# Patient Record
Sex: FEMALE | Race: WHITE | NOT HISPANIC OR LATINO | Employment: FULL TIME | URBAN - METROPOLITAN AREA
[De-identification: names, ages, dates, MRNs, and addresses within clinical notes are randomized per-mention and may not be internally consistent; named-entity substitution may affect disease eponyms.]

---

## 2017-04-18 DIAGNOSIS — Z45.02 ICD (IMPLANTABLE CARDIOVERTER-DEFIBRILLATOR) BATTERY DEPLETION: Primary | ICD-10-CM

## 2017-05-22 ENCOUNTER — HOSPITAL ENCOUNTER (OUTPATIENT)
Dept: PREADMISSION TESTING | Facility: OTHER | Age: 57
Discharge: HOME | End: 2017-05-22
Attending: INTERNAL MEDICINE
Payer: COMMERCIAL

## 2017-05-22 VITALS
OXYGEN SATURATION: 98 % | HEART RATE: 63 BPM | TEMPERATURE: 98 F | SYSTOLIC BLOOD PRESSURE: 136 MMHG | DIASTOLIC BLOOD PRESSURE: 85 MMHG | WEIGHT: 182 LBS | HEIGHT: 62 IN | BODY MASS INDEX: 33.49 KG/M2

## 2017-05-22 LAB
ANION GAP SERPL CALC-SCNC: 15 MMOL/L
APTT BLDCRRT: 22.9 SEC
BASOPHILS # BLD AUTO: 0.04 K/UL
BASOPHILS NFR BLD: 0.5 %
BUN SERPL-MCNC: 17 MG/DL
CALCIUM SERPL-MCNC: 10.2 MG/DL
CHLORIDE SERPL-SCNC: 102 MMOL/L
CO2 SERPL-SCNC: 23 MMOL/L
CREAT SERPL-MCNC: 0.9 MG/DL
DIFFERENTIAL METHOD: ABNORMAL
EOSINOPHIL # BLD AUTO: 0.5 K/UL
EOSINOPHIL NFR BLD: 6.2 %
ERYTHROCYTE [DISTWIDTH] IN BLOOD BY AUTOMATED COUNT: 14.8 %
EST. GFR  (AFRICAN AMERICAN): >60 ML/MIN/1.73 M^2
EST. GFR  (NON AFRICAN AMERICAN): >60 ML/MIN/1.73 M^2
GLUCOSE SERPL-MCNC: 188 MG/DL
HCT VFR BLD AUTO: 44.9 %
HGB BLD-MCNC: 14.4 G/DL
INR PPP: 0.9
LYMPHOCYTES # BLD AUTO: 1.1 K/UL
LYMPHOCYTES NFR BLD: 14.4 %
MCH RBC QN AUTO: 28.2 PG
MCHC RBC AUTO-ENTMCNC: 32.1 %
MCV RBC AUTO: 88 FL
MONOCYTES # BLD AUTO: 0.5 K/UL
MONOCYTES NFR BLD: 7.1 %
NEUTROPHILS # BLD AUTO: 5.4 K/UL
NEUTROPHILS NFR BLD: 71.5 %
PLATELET # BLD AUTO: 241 K/UL
PMV BLD AUTO: 10.1 FL
POTASSIUM SERPL-SCNC: 3.7 MMOL/L
PROTHROMBIN TIME: 10.1 SEC
RBC # BLD AUTO: 5.1 M/UL
SODIUM SERPL-SCNC: 140 MMOL/L
WBC # BLD AUTO: 7.57 K/UL

## 2017-05-22 PROCEDURE — 85610 PROTHROMBIN TIME: CPT

## 2017-05-22 PROCEDURE — 93010 ELECTROCARDIOGRAM REPORT: CPT | Mod: ,,, | Performed by: INTERNAL MEDICINE

## 2017-05-22 PROCEDURE — 80048 BASIC METABOLIC PNL TOTAL CA: CPT

## 2017-05-22 PROCEDURE — 85025 COMPLETE CBC W/AUTO DIFF WBC: CPT

## 2017-05-22 PROCEDURE — 36415 COLL VENOUS BLD VENIPUNCTURE: CPT

## 2017-05-22 PROCEDURE — 93005 ELECTROCARDIOGRAM TRACING: CPT

## 2017-05-22 PROCEDURE — 85730 THROMBOPLASTIN TIME PARTIAL: CPT

## 2017-05-22 RX ORDER — ROSUVASTATIN CALCIUM 20 MG/1
20 TABLET, COATED ORAL DAILY
COMMUNITY

## 2017-05-22 NOTE — DISCHARGE INSTRUCTIONS
PRE-ADMIT TESTING -  [phone]    [address]  [facility]        OUTPATIENT SURGERY UNIT - [phone]    Your surgery has been scheduled at [facility]. We are pleased to have the opportunity to serve you. For Further Information please call [phone].    On the day of surgery please report to the Information Desk on the 1st floor.    · CONTACT YOUR PHYSICIAN'S OFFICE THE DAY PRIOR TO YOUR SURGERY TO OBTAIN YOUR ARRIVAL TIME.     · The evening before surgery do not eat anything after 9 p.m. ( this includes hard candy, chewing gum and mints).  You may only have GATORADE, POWERADE AND WATER  from 9 p.m. until you leave your home.   DO NOT DRINK ANY LIQUIDS ON THE WAY TO THE HOSPITAL.      SPECIAL MEDICATION INSTRUCTIONS: TAKE medications checked off by the Anesthesiologist on your Medication List.    Angiogram Patients: Take medications as instructed by your physician, including aspirin.     Surgery Patients:    If you take ASPIRIN - Your PHYSICIAN/SURGEON will need to inform you IF/OR when you need to stop taking aspirin prior to your surgery.     Do Not take any medications containing IBUPROFEN.  Do Not Wear any make-up or dark nail polish   (especially eye make-up) to surgery. If you come to surgery with makeup on you will be required to remove the makeup or nail polish.    Do not shave your surgical area at least 5 days prior to your surgery. The surgical prep will be performed at the hospital according to Infection Control regulations.    Leave all valuables at home.   Do Not wear any jewelry or watches, including any metal in body piercings.  Contact Lens must be removed before surgery. Either do not wear the contact lens or bring a case and solution for storage.  Please bring a container for eyeglasses or dentures as required.  Bring any paperwork your physician has provided, such as consent forms,  history and physicals, doctor's orders, etc.   Bring comfortable clothes  that are loose fitting to wear upon discharge. Take into consideration the type of surgery being performed.  Maintain your diet as advised per your physician the day prior to surgery.      Adequate rest the night before surgery is advised.   Park in the Parking lot behind the hospital or in the [facility] across the street from the parking lot. Parking is complimentary.  If you will be discharged the same day as your procedure, please arrange for a responsible adult to drive you home or to accompany you if traveling by taxi.   YOU WILL NOT BE PERMITTED TO DRIVE OR TO LEAVE THE HOSPITAL ALONE AFTER SURGERY.   It is strongly recommended that you arrange for someone to remain with you for the first 24 hrs following your surgery.       Thank you for your cooperation.  The Staff of [facility].        Bathing Instructions                                                                 Please shower the evening before and morning of your procedure with    ANTIBACTERIAL SOAP. ( DIAL, etc )  Concentrate on the surgical area   for at least 3 minutes and rinse completely. Dry off as usual.   Do not use any deodorant, powder, body lotions, perfume, after shave or    cologne.

## 2017-05-24 ENCOUNTER — HOSPITAL ENCOUNTER (OUTPATIENT)
Facility: OTHER | Age: 57
Discharge: HOME | End: 2017-05-24
Attending: INTERNAL MEDICINE | Admitting: INTERNAL MEDICINE
Payer: COMMERCIAL

## 2017-05-24 VITALS
TEMPERATURE: 98 F | SYSTOLIC BLOOD PRESSURE: 97 MMHG | RESPIRATION RATE: 16 BRPM | BODY MASS INDEX: 33.49 KG/M2 | HEIGHT: 62 IN | DIASTOLIC BLOOD PRESSURE: 65 MMHG | HEART RATE: 53 BPM | OXYGEN SATURATION: 97 % | WEIGHT: 182 LBS

## 2017-05-24 DIAGNOSIS — Z45.02 ICD (IMPLANTABLE CARDIOVERTER-DEFIBRILLATOR) BATTERY DEPLETION: ICD-10-CM

## 2017-05-24 DIAGNOSIS — I25.10 ATHEROSCLEROTIC HEART DISEASE OF NATIVE CORONARY ARTERY WITHOUT ANGINA PECTORIS: ICD-10-CM

## 2017-05-24 DIAGNOSIS — Z95.810 AUTOMATIC IMPLANTABLE CARDIAC DEFIBRILLATOR IN SITU: Primary | ICD-10-CM

## 2017-05-24 DIAGNOSIS — I25.2 OLD MYOCARDIAL INFARCTION: ICD-10-CM

## 2017-05-24 LAB — POCT GLUCOSE: 155 MG/DL (ref 70–110)

## 2017-05-24 PROCEDURE — 63600175 PHARM REV CODE 636 W HCPCS

## 2017-05-24 PROCEDURE — 27201642 EP LAB PROCEDURE

## 2017-05-24 PROCEDURE — 25000003 PHARM REV CODE 250

## 2017-05-24 PROCEDURE — 63600175 PHARM REV CODE 636 W HCPCS: Performed by: INTERNAL MEDICINE

## 2017-05-24 RX ORDER — SODIUM CHLORIDE 9 MG/ML
INJECTION, SOLUTION INTRAVENOUS CONTINUOUS
Status: DISCONTINUED | OUTPATIENT
Start: 2017-05-24 | End: 2017-05-24 | Stop reason: HOSPADM

## 2017-05-24 RX ORDER — CEPHALEXIN 500 MG/1
500 CAPSULE ORAL EVERY 8 HOURS
Qty: 21 CAPSULE | Refills: 0 | Status: SHIPPED | OUTPATIENT
Start: 2017-05-24 | End: 2017-05-31

## 2017-05-24 RX ORDER — CEFAZOLIN SODIUM 2 G/50ML
2 SOLUTION INTRAVENOUS
Status: COMPLETED | OUTPATIENT
Start: 2017-05-24 | End: 2017-05-24

## 2017-05-24 RX ADMIN — CEFAZOLIN SODIUM 2 G: 2 SOLUTION INTRAVENOUS at 01:05

## 2017-05-24 RX ADMIN — SODIUM CHLORIDE: 9 INJECTION, SOLUTION INTRAVENOUS at 01:05

## 2017-05-24 NOTE — DISCHARGE SUMMARY
[facility]  Cardiology  Discharge Summary      Patient Name: Graciela Rhodes  MRN: [id number]  Admission Date: 5/24/2017  Hospital Length of Stay: 0 days  Discharge Date and Time:  05/24/2017 3:09 PM  Attending Physician: Garrett Watts MD  Discharging Provider: Payton Devries NP  Primary Care Physician: Jake Hess MD    HPI: Ms. Rhodes is a 56 year old female patient of Dr. Anthony with CAD, s/p STEMI, HFrEF 15% ischemic etiology, NYHA II, referred for Medtronic ICD gen change. Denies chest pain, PND, orthopnea. Stable FAM at 1 block. ICD for primary prevention, no ICD therapies received. No fever, chills, infection.    Generator replacment performed with preoperative antibiotics and under RN moderate sedation without complications.  Discharged home same day.     Procedure(s) (LRB):  REPLACEMENT-GENERATOR-ICD (N/A)     Indwelling Lines/Drains at time of discharge:  Lines/Drains/Airways          No matching active lines, drains, or airways          Hospital Course (synopsis of major diagnoses, care, treatment, and services provided during the course of the hospital stay): Ms. Rhodes is a 56 year old female patient of Dr. Anthony with CAD, s/p STEMI, HFrEF 15% ischemic etiology, NYHA II, referred for Medtronic ICD gen change. Denies chest pain, PND, orthopnea. Stable FAM at 1 block. ICD for primary prevention, no ICD therapies received. No fever, chills, infection.    Generator replacment performed with preoperative antibiotics and under RN moderate sedation without complications.  Discharged home same day.     Consults: none    Significant Diagnostic Studies: Labs:   BMP: No results for input(s): GLU, NA, K, CL, CO2, BUN, CREATININE, CALCIUM, MG in the last 48 hours., CBC No results for input(s): WBC, HGB, HCT, PLT in the last 48 hours. and INR   Lab Results   Component Value Date    INR 0.9 05/22/2017    INR 0.9 08/04/2009       Pending Diagnostic Studies:     None           Final Active Diagnoses:    Diagnosis Date Noted POA    ICD (implantable cardioverter-defibrillator) battery depletion [Z45.02] 05/24/2017 Not Applicable      Problems Resolved During this Admission:    Diagnosis Date Noted Date Resolved POA       Discharged Condition: fair    Follow Up:  RAI Degroot with [facility] Thursday June 1st    Patient Instructions:   Diet: cardiac    Activity: no driving x 24 hours; otherwise resume all normal activities    Incision: leave dressing in place; do not get incision wet.    Medications:  Reconciled Home Medications:   Current Discharge Medication List      START taking these medications    Details   cephALEXin (KEFLEX) 500 MG capsule Take 1 capsule (500 mg total) by mouth every 8 (eight) hours.  Qty: 21 capsule, Refills: 0         CONTINUE these medications which have NOT CHANGED    Details   aspirin (ECOTRIN) 81 MG EC tablet Take by mouth once daily.      furosemide (LASIX) 40 MG tablet Take 1 tablet (40 mg total) by mouth once daily.  Qty: 90 tablet, Refills: 3    Associated Diagnoses: Cardiomyopathy      levothyroxine (SYNTHROID) 75 MCG tablet       metoprolol tartrate (LOPRESSOR) 25 MG tablet Take 25 mg by mouth 2 (two) times daily.      ramipril (ALTACE) 2.5 MG capsule       rosuvastatin (CRESTOR) 20 MG tablet Take 20 mg by mouth once daily.      SITagliptan (JANUVIA) 100 MG Tab Take 100 mg by mouth once daily.      ACCU-CHEK MICHELLE PLUS Strp       ACCU-CHEK MULTICLIX LANCET lancets       clopidogrel (PLAVIX) 75 mg tablet Take 75 mg by mouth once daily.      ibuprofen (ADVIL,MOTRIN) 600 MG tablet Take 1 tablet (600 mg total) by mouth every 6 (six) hours as needed for Pain.  Qty: 20 tablet, Refills: 0      metformin (GLUCOPHAGE) 1000 MG tablet Take 1,000 mg by mouth 2 (two) times daily with meals.             Time spent on the discharge of patient: 30 minutes    Payton Devries NP  Cardiology  [facility]

## 2017-05-24 NOTE — DISCHARGE INSTRUCTIONS
Anesthesia: Monitored Anesthesia Care (MAC)    Anesthesia Safety  · Have an adult family member or friend drive you home after the procedure.  · For the first 24 hours after your surgery:  ¨ Do not drive or use heavy equipment.  ¨ Do not make important decisions or sign documents.  ¨ Avoid alcohol.  ¨ Have someone stay with you, if possible. They can watch for problems and help keep you safe.    Leave dressing in place; do not get incision wet

## 2018-01-29 ENCOUNTER — OFFICE VISIT (OUTPATIENT)
Dept: CARDIOLOGY | Facility: CLINIC | Age: 58
End: 2018-01-29
Attending: INTERNAL MEDICINE
Payer: COMMERCIAL

## 2018-01-29 VITALS
BODY MASS INDEX: 33.86 KG/M2 | SYSTOLIC BLOOD PRESSURE: 132 MMHG | WEIGHT: 184 LBS | HEART RATE: 77 BPM | DIASTOLIC BLOOD PRESSURE: 78 MMHG | HEIGHT: 62 IN

## 2018-01-29 DIAGNOSIS — I51.9 LV DYSFUNCTION: ICD-10-CM

## 2018-01-29 DIAGNOSIS — I25.10 ATHEROSCLEROSIS OF NATIVE CORONARY ARTERY OF NATIVE HEART WITHOUT ANGINA PECTORIS: Primary | ICD-10-CM

## 2018-01-29 DIAGNOSIS — R05.9 COUGH: Primary | ICD-10-CM

## 2018-01-29 DIAGNOSIS — I25.2 OLD MYOCARDIAL INFARCTION: ICD-10-CM

## 2018-01-29 DIAGNOSIS — Z95.810 AICD (AUTOMATIC CARDIOVERTER/DEFIBRILLATOR) PRESENT: ICD-10-CM

## 2018-01-29 DIAGNOSIS — E11.9 TYPE 2 DIABETES MELLITUS WITHOUT COMPLICATION, WITHOUT LONG-TERM CURRENT USE OF INSULIN: ICD-10-CM

## 2018-01-29 PROCEDURE — 3008F BODY MASS INDEX DOCD: CPT | Mod: S$GLB,,, | Performed by: INTERNAL MEDICINE

## 2018-01-29 PROCEDURE — 99213 OFFICE O/P EST LOW 20 MIN: CPT | Mod: S$GLB,,, | Performed by: INTERNAL MEDICINE

## 2018-01-29 RX ORDER — AZITHROMYCIN 250 MG/1
TABLET, FILM COATED ORAL
Qty: 6 TABLET | Refills: 0 | Status: SHIPPED | OUTPATIENT
Start: 2018-01-29 | End: 2018-02-03

## 2018-02-04 NOTE — PROGRESS NOTES
Subjective:    Patient ID:  Graciela Rhodes is a 57 y.o. female     HPI  Here for F/U of CAD, LV dysfunction, AICD, DM.    I feel well. I have no complaints.    Current Outpatient Prescriptions   Medication Sig    ACCU-CHEK MICHELLE PLUS Strp     ACCU-CHEK MULTICLIX LANCET lancets     aspirin (ECOTRIN) 81 MG EC tablet Take by mouth once daily.    clopidogrel (PLAVIX) 75 mg tablet Take 75 mg by mouth once daily.    furosemide (LASIX) 40 MG tablet TAKE 1 TABLET(40 MG) BY MOUTH EVERY DAY    ibuprofen (ADVIL,MOTRIN) 600 MG tablet Take 1 tablet (600 mg total) by mouth every 6 (six) hours as needed for Pain.    JANUVIA 100 mg Tab TAKE 1 TABLET BY MOUTH EVERY DAY    JANUVIA 100 mg Tab TAKE 1 TABLET BY MOUTH EVERY DAY    levothyroxine (SYNTHROID) 75 MCG tablet     metFORMIN (GLUCOPHAGE) 1000 MG tablet TAKE 1 TABLET BY MOUTH TWICE DAILY    metoprolol tartrate (LOPRESSOR) 25 MG tablet Take 25 mg by mouth 2 (two) times daily.    ramipril (ALTACE) 2.5 MG capsule     rosuvastatin (CRESTOR) 20 MG tablet Take 20 mg by mouth once daily.     No current facility-administered medications for this visit.          Review of Systems   Constitution: Negative for chills, decreased appetite, fever, weight gain and weight loss.   HENT: Negative for congestion, hearing loss and sore throat.    Eyes: Negative for blurred vision, double vision and visual disturbance.   Cardiovascular: Negative for chest pain, claudication, dyspnea on exertion, leg swelling, palpitations and syncope.   Respiratory: Negative for cough, hemoptysis, shortness of breath, sputum production and wheezing.    Endocrine: Negative for cold intolerance and heat intolerance.   Hematologic/Lymphatic: Negative for bleeding problem. Does not bruise/bleed easily.   Skin: Negative for color change, dry skin, flushing and itching.   Musculoskeletal: Negative for back pain, joint pain and myalgias.   Gastrointestinal: Negative for abdominal pain, anorexia,  "constipation, diarrhea, dysphagia, nausea and vomiting.        No bleeding per rectum   Genitourinary: Negative for dysuria, flank pain, frequency, hematuria and nocturia.   Neurological: Negative for dizziness, headaches, light-headedness, loss of balance, seizures and tremors.   Psychiatric/Behavioral: Negative for altered mental status and depression.         Vitals:    01/29/18 1110   BP: 132/78   Pulse: 77   Weight: 83.5 kg (184 lb)   Height: 5' 2" (1.575 m)     Objective:    Physical Exam   Constitutional: She is oriented to person, place, and time. She appears well-developed and well-nourished.   HENT:   Head: Normocephalic and atraumatic.   Nose: Nose normal.   Mouth/Throat: Oropharynx is clear and moist.   Eyes: Conjunctivae and EOM are normal. Pupils are equal, round, and reactive to light.   Neck: Neck supple. No tracheal deviation present. No thyromegaly present.   Cardiovascular: Normal rate, regular rhythm and intact distal pulses.  Exam reveals no gallop and no friction rub.    No murmur heard.  Pulmonary/Chest: No respiratory distress. She has no wheezes. She has no rales. She exhibits no tenderness.   Abdominal: Soft. Bowel sounds are normal. She exhibits no distension and no mass. There is no tenderness. There is no rebound and no guarding.   Musculoskeletal: Normal range of motion.   Lymphadenopathy:     She has no cervical adenopathy.   Neurological: She is alert and oriented to person, place, and time.   Skin: Skin is warm and dry.   Psychiatric: Her behavior is normal.       Echo from 9/2017 reviewed.    Assessment:       1. Atherosclerosis of native coronary artery of native heart without angina pectoris    2. Old myocardial infarction    3. LV dysfunction    4. AICD (automatic cardioverter/defibrillator) present    5. Type 2 diabetes mellitus without complication, without long-term current use of insulin         Plan:       Stable  Continue the same            "

## 2018-06-04 ENCOUNTER — OFFICE VISIT (OUTPATIENT)
Dept: CARDIOLOGY | Facility: CLINIC | Age: 58
End: 2018-06-04
Attending: INTERNAL MEDICINE
Payer: COMMERCIAL

## 2018-06-04 VITALS
SYSTOLIC BLOOD PRESSURE: 108 MMHG | WEIGHT: 174 LBS | DIASTOLIC BLOOD PRESSURE: 76 MMHG | HEART RATE: 71 BPM | BODY MASS INDEX: 32.02 KG/M2 | HEIGHT: 62 IN

## 2018-06-04 DIAGNOSIS — I51.9 LV DYSFUNCTION: ICD-10-CM

## 2018-06-04 DIAGNOSIS — I25.10 ATHEROSCLEROSIS OF NATIVE CORONARY ARTERY OF NATIVE HEART WITHOUT ANGINA PECTORIS: ICD-10-CM

## 2018-06-04 DIAGNOSIS — Z95.810 ICD (IMPLANTABLE CARDIOVERTER-DEFIBRILLATOR) IN PLACE: Primary | ICD-10-CM

## 2018-06-04 DIAGNOSIS — I25.2 OLD MYOCARDIAL INFARCTION: ICD-10-CM

## 2018-06-04 DIAGNOSIS — Z95.810 AICD (AUTOMATIC CARDIOVERTER/DEFIBRILLATOR) PRESENT: ICD-10-CM

## 2018-06-04 DIAGNOSIS — E11.9 TYPE 2 DIABETES MELLITUS WITHOUT COMPLICATION, WITHOUT LONG-TERM CURRENT USE OF INSULIN: ICD-10-CM

## 2018-06-04 PROCEDURE — 93283 PRGRMG EVAL IMPLANTABLE DFB: CPT | Mod: S$GLB,,, | Performed by: INTERNAL MEDICINE

## 2018-06-04 PROCEDURE — 3008F BODY MASS INDEX DOCD: CPT | Mod: CPTII,S$GLB,, | Performed by: INTERNAL MEDICINE

## 2018-06-04 PROCEDURE — 99214 OFFICE O/P EST MOD 30 MIN: CPT | Mod: S$GLB,,, | Performed by: INTERNAL MEDICINE

## 2018-06-12 NOTE — PROGRESS NOTES
Subjective:    Patient ID:  Graciela Rhodes is a 58 y.o. female     HPI  here for follow-up of coronary artery disease, previous myocardial infarction, left ventricular dysfunction, last echocardiogram in September of 2017 with an ejection fraction of 35-40%, AICD, diabetes mellitus.    I am doing well.  I get around without any limitations.  I have no complaints.    Current Outpatient Prescriptions   Medication Sig    JANUVIA 100 mg Tab TAKE 1 TABLET BY MOUTH EVERY DAY    ACCU-CHEK MICHELLE PLUS Strp     ACCU-CHEK MULTICLIX LANCET lancets     aspirin (ECOTRIN) 81 MG EC tablet Take by mouth once daily.    clopidogrel (PLAVIX) 75 mg tablet Take 75 mg by mouth once daily.    furosemide (LASIX) 40 MG tablet TAKE 1 TABLET(40 MG) BY MOUTH EVERY DAY    furosemide (LASIX) 40 MG tablet TAKE 1 TABLET(40 MG) BY MOUTH EVERY DAY    ibuprofen (ADVIL,MOTRIN) 600 MG tablet Take 1 tablet (600 mg total) by mouth every 6 (six) hours as needed for Pain.    levothyroxine (SYNTHROID) 75 MCG tablet     metFORMIN (GLUCOPHAGE) 1000 MG tablet TAKE 1 TABLET BY MOUTH TWICE DAILY    metFORMIN (GLUCOPHAGE) 1000 MG tablet TAKE 1 TABLET BY MOUTH TWICE DAILY    metoprolol tartrate (LOPRESSOR) 25 MG tablet Take 25 mg by mouth 2 (two) times daily.    ramipril (ALTACE) 2.5 MG capsule     rosuvastatin (CRESTOR) 20 MG tablet Take 20 mg by mouth once daily.     No current facility-administered medications for this visit.          Review of Systems   Constitution: Negative for chills, decreased appetite, fever, weight gain and weight loss.   HENT: Negative for congestion, hearing loss and sore throat.    Eyes: Negative for blurred vision, double vision and visual disturbance.   Cardiovascular: Negative for chest pain, claudication, dyspnea on exertion, leg swelling, palpitations and syncope.   Respiratory: Negative for cough, hemoptysis, shortness of breath, sputum production and wheezing.    Endocrine: Negative for cold intolerance and  "heat intolerance.   Hematologic/Lymphatic: Negative for bleeding problem. Does not bruise/bleed easily.   Skin: Negative for color change, dry skin, flushing and itching.   Musculoskeletal: Negative for back pain, joint pain and myalgias.   Gastrointestinal: Negative for abdominal pain, anorexia, constipation, diarrhea, dysphagia, nausea and vomiting.        No bleeding per rectum   Genitourinary: Negative for dysuria, flank pain, frequency, hematuria and nocturia.   Neurological: Negative for dizziness, headaches, light-headedness, loss of balance, seizures and tremors.   Psychiatric/Behavioral: Negative for altered mental status and depression.         Vitals:    06/04/18 1100   BP: 108/76   Pulse: 71   Weight: 78.9 kg (174 lb)   Height: 5' 2" (1.575 m)     Objective:    Physical Exam   Constitutional: She is oriented to person, place, and time. She appears well-developed and well-nourished.   HENT:   Head: Normocephalic and atraumatic.   Nose: Nose normal.   Mouth/Throat: Oropharynx is clear and moist.   Eyes: Conjunctivae and EOM are normal. Pupils are equal, round, and reactive to light.   Neck: Neck supple. No tracheal deviation present. No thyromegaly present.   Cardiovascular: Normal rate, regular rhythm and intact distal pulses.  Exam reveals no gallop and no friction rub.    No murmur heard.  Pulmonary/Chest: No respiratory distress. She has no wheezes. She has no rales. She exhibits no tenderness.   Abdominal: Soft. Bowel sounds are normal. She exhibits no distension and no mass. There is no tenderness. There is no rebound and no guarding.   Musculoskeletal: Normal range of motion.   Lymphadenopathy:     She has no cervical adenopathy.   Neurological: She is alert and oriented to person, place, and time.   Skin: Skin is warm and dry.   Psychiatric: Her behavior is normal.       AICD interrogated.    Assessment:           2. Atherosclerosis of native coronary artery of native heart without angina pectoris  "   3. Old myocardial infarction    4. AICD (automatic cardioverter/defibrillator) present    5. LV dysfunction    6. Type 2 diabetes mellitus without complication, without long-term current use of insulin         Plan:       Stable.  No changes, continue the same medications.

## 2018-10-01 ENCOUNTER — OFFICE VISIT (OUTPATIENT)
Dept: CARDIOLOGY | Facility: CLINIC | Age: 58
End: 2018-10-01
Attending: INTERNAL MEDICINE
Payer: COMMERCIAL

## 2018-10-01 VITALS
BODY MASS INDEX: 30.36 KG/M2 | SYSTOLIC BLOOD PRESSURE: 124 MMHG | HEART RATE: 77 BPM | WEIGHT: 165 LBS | HEIGHT: 62 IN | DIASTOLIC BLOOD PRESSURE: 74 MMHG

## 2018-10-01 DIAGNOSIS — Z95.810 AICD (AUTOMATIC CARDIOVERTER/DEFIBRILLATOR) PRESENT: ICD-10-CM

## 2018-10-01 DIAGNOSIS — I25.2 OLD MYOCARDIAL INFARCTION: ICD-10-CM

## 2018-10-01 DIAGNOSIS — I51.9 LV DYSFUNCTION: ICD-10-CM

## 2018-10-01 DIAGNOSIS — E11.9 TYPE 2 DIABETES MELLITUS WITHOUT COMPLICATION, WITHOUT LONG-TERM CURRENT USE OF INSULIN: ICD-10-CM

## 2018-10-01 DIAGNOSIS — I25.10 ATHEROSCLEROSIS OF NATIVE CORONARY ARTERY OF NATIVE HEART WITHOUT ANGINA PECTORIS: Primary | ICD-10-CM

## 2018-10-01 PROCEDURE — 99213 OFFICE O/P EST LOW 20 MIN: CPT | Mod: S$GLB,,, | Performed by: INTERNAL MEDICINE

## 2018-10-01 PROCEDURE — 3008F BODY MASS INDEX DOCD: CPT | Mod: CPTII,S$GLB,, | Performed by: INTERNAL MEDICINE

## 2018-10-01 NOTE — PROGRESS NOTES
Subjective:    Patient ID:  Graciela Rhodes is a 58 y.o. female     HPI   Here for follow-up of coronary artery disease, previous myocardial infarction, left ventricular dysfunction, AICD, diabetes mellitus.    I feel well.  I have no complaints.    Current Outpatient Medications   Medication Sig    ACCU-CHEK MICHELLE PLUS Strp     ACCU-CHEK MULTICLIX LANCET lancets     aspirin (ECOTRIN) 81 MG EC tablet Take by mouth once daily.    clopidogrel (PLAVIX) 75 mg tablet Take 75 mg by mouth once daily.    furosemide (LASIX) 40 MG tablet TAKE 1 TABLET(40 MG) BY MOUTH EVERY DAY    furosemide (LASIX) 40 MG tablet TAKE 1 TABLET(40 MG) BY MOUTH EVERY DAY    ibuprofen (ADVIL,MOTRIN) 600 MG tablet Take 1 tablet (600 mg total) by mouth every 6 (six) hours as needed for Pain.    JANUVIA 100 mg Tab TAKE 1 TABLET BY MOUTH EVERY DAY    levothyroxine (SYNTHROID) 75 MCG tablet     metFORMIN (GLUCOPHAGE) 1000 MG tablet TAKE 1 TABLET BY MOUTH TWICE DAILY    metFORMIN (GLUCOPHAGE) 1000 MG tablet TAKE 1 TABLET BY MOUTH TWICE DAILY    metoprolol tartrate (LOPRESSOR) 25 MG tablet Take 25 mg by mouth 2 (two) times daily.    ramipril (ALTACE) 2.5 MG capsule     rosuvastatin (CRESTOR) 20 MG tablet Take 20 mg by mouth once daily.     No current facility-administered medications for this visit.          Review of Systems   Constitution: Negative for chills, decreased appetite, fever, weight gain and weight loss.   HENT: Negative for congestion, hearing loss and sore throat.    Eyes: Negative for blurred vision, double vision and visual disturbance.   Cardiovascular: Negative for chest pain, claudication, dyspnea on exertion, leg swelling, palpitations and syncope.   Respiratory: Negative for cough, hemoptysis, shortness of breath, sputum production and wheezing.    Endocrine: Negative for cold intolerance and heat intolerance.   Hematologic/Lymphatic: Negative for bleeding problem. Does not bruise/bleed easily.   Skin: Negative for  "color change, dry skin, flushing and itching.   Musculoskeletal: Negative for back pain, joint pain and myalgias.   Gastrointestinal: Negative for abdominal pain, anorexia, constipation, diarrhea, dysphagia, nausea and vomiting.        No bleeding per rectum   Genitourinary: Negative for dysuria, flank pain, frequency, hematuria and nocturia.   Neurological: Negative for dizziness, headaches, light-headedness, loss of balance, seizures and tremors.   Psychiatric/Behavioral: Negative for altered mental status and depression.         Vitals:    10/01/18 1012   BP: 124/74   Pulse: 77   Weight: 74.8 kg (165 lb)   Height: 5' 2" (1.575 m)     Objective:    Physical Exam   Constitutional: She is oriented to person, place, and time. She appears well-developed and well-nourished.   HENT:   Head: Normocephalic and atraumatic.   Nose: Nose normal.   Mouth/Throat: Oropharynx is clear and moist.   Eyes: Conjunctivae and EOM are normal. Pupils are equal, round, and reactive to light.   Neck: Neck supple. No tracheal deviation present. No thyromegaly present.   Cardiovascular: Normal rate, regular rhythm and intact distal pulses. Exam reveals no gallop and no friction rub.   No murmur heard.  Pulmonary/Chest: No respiratory distress. She has no wheezes. She has no rales. She exhibits no tenderness.   Abdominal: Soft. Bowel sounds are normal. She exhibits no distension and no mass. There is no tenderness. There is no rebound and no guarding.   Musculoskeletal: Normal range of motion.   Lymphadenopathy:     She has no cervical adenopathy.   Neurological: She is alert and oriented to person, place, and time.   Skin: Skin is warm and dry.   Psychiatric: Her behavior is normal.         Assessment:       1. Atherosclerosis of native coronary artery of native heart without angina pectoris    2. Old myocardial infarction    3. LV dysfunction    4. AICD (automatic cardioverter/defibrillator) present    5. Type 2 diabetes mellitus without " complication, without long-term current use of insulin         Plan:       Stable.  Continue the same medical regimen.

## 2019-01-26 DIAGNOSIS — E11.9 DIABETES MELLITUS WITHOUT COMPLICATION: ICD-10-CM

## 2019-01-28 RX ORDER — SITAGLIPTIN 100 MG/1
TABLET, FILM COATED ORAL
Qty: 30 TABLET | Refills: 0 | Status: SHIPPED | OUTPATIENT
Start: 2019-01-28 | End: 2019-02-24 | Stop reason: SDUPTHER

## 2019-02-04 ENCOUNTER — OFFICE VISIT (OUTPATIENT)
Dept: CARDIOLOGY | Facility: CLINIC | Age: 59
End: 2019-02-04
Attending: INTERNAL MEDICINE
Payer: COMMERCIAL

## 2019-02-04 VITALS
SYSTOLIC BLOOD PRESSURE: 125 MMHG | WEIGHT: 170 LBS | HEART RATE: 86 BPM | DIASTOLIC BLOOD PRESSURE: 76 MMHG | BODY MASS INDEX: 31.28 KG/M2 | HEIGHT: 62 IN

## 2019-02-04 DIAGNOSIS — E11.9 TYPE 2 DIABETES MELLITUS WITHOUT COMPLICATION, WITHOUT LONG-TERM CURRENT USE OF INSULIN: ICD-10-CM

## 2019-02-04 DIAGNOSIS — I25.10 ATHEROSCLEROSIS OF NATIVE CORONARY ARTERY OF NATIVE HEART WITHOUT ANGINA PECTORIS: Primary | ICD-10-CM

## 2019-02-04 DIAGNOSIS — Z95.810 AICD (AUTOMATIC CARDIOVERTER/DEFIBRILLATOR) PRESENT: ICD-10-CM

## 2019-02-04 DIAGNOSIS — I25.2 OLD MYOCARDIAL INFARCTION: ICD-10-CM

## 2019-02-04 DIAGNOSIS — I51.9 LV DYSFUNCTION: ICD-10-CM

## 2019-02-04 PROCEDURE — 93000 PR ELECTROCARDIOGRAM, COMPLETE: ICD-10-PCS | Mod: S$GLB,,, | Performed by: INTERNAL MEDICINE

## 2019-02-04 PROCEDURE — 99213 OFFICE O/P EST LOW 20 MIN: CPT | Mod: S$GLB,,, | Performed by: INTERNAL MEDICINE

## 2019-02-04 PROCEDURE — 99213 PR OFFICE/OUTPT VISIT, EST, LEVL III, 20-29 MIN: ICD-10-PCS | Mod: S$GLB,,, | Performed by: INTERNAL MEDICINE

## 2019-02-04 PROCEDURE — 93000 ELECTROCARDIOGRAM COMPLETE: CPT | Mod: S$GLB,,, | Performed by: INTERNAL MEDICINE

## 2019-02-04 NOTE — PROGRESS NOTES
Subjective:    Patient ID:  Graciela Rhodes is a 58 y.o. female     HPI   Here for follow-up of coronary artery disease, previous myocardial infarction, left ventricular dysfunction, status post placement of an AICD, diabetes    [place].  I feel well.  I am physically quite active.  Have no complaints.    Current Outpatient Medications   Medication Sig    metFORMIN (GLUCOPHAGE) 1000 MG tablet TAKE 1 TABLET BY MOUTH TWICE DAILY    ACCU-CHEK MICHELLE PLUS Strp     ACCU-CHEK MULTICLIX LANCET lancets     aspirin (ECOTRIN) 81 MG EC tablet Take by mouth once daily.    clopidogrel (PLAVIX) 75 mg tablet Take 75 mg by mouth once daily.    furosemide (LASIX) 40 MG tablet TAKE 1 TABLET(40 MG) BY MOUTH EVERY DAY    furosemide (LASIX) 40 MG tablet TAKE 1 TABLET(40 MG) BY MOUTH EVERY DAY    ibuprofen (ADVIL,MOTRIN) 600 MG tablet Take 1 tablet (600 mg total) by mouth every 6 (six) hours as needed for Pain.    JANUVIA 100 mg Tab TAKE 1 TABLET BY MOUTH EVERY DAY    levothyroxine (SYNTHROID) 75 MCG tablet     metFORMIN (GLUCOPHAGE) 1000 MG tablet TAKE 1 TABLET BY MOUTH TWICE DAILY    metoprolol tartrate (LOPRESSOR) 25 MG tablet Take 25 mg by mouth 2 (two) times daily.    ramipril (ALTACE) 2.5 MG capsule     rosuvastatin (CRESTOR) 20 MG tablet Take 20 mg by mouth once daily.     No current facility-administered medications for this visit.          Review of Systems   Constitution: Negative for chills, decreased appetite, fever, weight gain and weight loss.   HENT: Negative for congestion, hearing loss and sore throat.    Eyes: Negative for blurred vision, double vision and visual disturbance.   Cardiovascular: Negative for chest pain, claudication, dyspnea on exertion, leg swelling, palpitations and syncope.   Respiratory: Negative for cough, hemoptysis, shortness of breath, sputum production and wheezing.    Endocrine: Negative for cold intolerance and heat intolerance.   Hematologic/Lymphatic: Negative for bleeding  "problem. Does not bruise/bleed easily.   Skin: Negative for color change, dry skin, flushing and itching.   Musculoskeletal: Negative for back pain, joint pain and myalgias.   Gastrointestinal: Negative for abdominal pain, anorexia, constipation, diarrhea, dysphagia, nausea and vomiting.        No bleeding per rectum   Genitourinary: Negative for dysuria, flank pain, frequency, hematuria and nocturia.   Neurological: Negative for dizziness, headaches, light-headedness, loss of balance, seizures and tremors.   Psychiatric/Behavioral: Negative for altered mental status and depression.         Vitals:    02/04/19 1044   BP: 125/76   Pulse: 86   Weight: 77.1 kg (170 lb)   Height: 5' 2" (1.575 m)     Objective:    Physical Exam   Constitutional: She is oriented to person, place, and time. She appears well-developed and well-nourished.   HENT:   Head: Normocephalic and atraumatic.   Nose: Nose normal.   Mouth/Throat: Oropharynx is clear and moist.   Eyes: Conjunctivae and EOM are normal. Pupils are equal, round, and reactive to light.   Neck: Neck supple. No tracheal deviation present. No thyromegaly present.   Cardiovascular: Normal rate, regular rhythm and intact distal pulses. Exam reveals no gallop and no friction rub.   No murmur heard.  Pulmonary/Chest: No respiratory distress. She has no wheezes. She has no rales. She exhibits no tenderness.   Abdominal: Soft. Bowel sounds are normal. She exhibits no distension and no mass. There is no tenderness. There is no rebound and no guarding.   Musculoskeletal: Normal range of motion.   Lymphadenopathy:     She has no cervical adenopathy.   Neurological: She is alert and oriented to person, place, and time.   Skin: Skin is warm and dry.   Psychiatric: Her behavior is normal.         Assessment:       1. Atherosclerosis of native coronary artery of native heart without angina pectoris    2. Old myocardial infarction    3. LV dysfunction    4. AICD (automatic " cardioverter/defibrillator) present    5. Type 2 diabetes mellitus without complication, without long-term current use of insulin         Plan:         Stable.  Continue the present pharmacological regimen.

## 2019-02-24 DIAGNOSIS — E11.9 DIABETES MELLITUS WITHOUT COMPLICATION: ICD-10-CM

## 2019-02-25 RX ORDER — SITAGLIPTIN 100 MG/1
TABLET, FILM COATED ORAL
Qty: 30 TABLET | Refills: 0 | Status: SHIPPED | OUTPATIENT
Start: 2019-02-25 | End: 2019-03-24 | Stop reason: SDUPTHER

## 2019-03-24 DIAGNOSIS — E11.9 DIABETES MELLITUS WITHOUT COMPLICATION: ICD-10-CM

## 2019-03-25 RX ORDER — SITAGLIPTIN 100 MG/1
TABLET, FILM COATED ORAL
Qty: 30 TABLET | Refills: 0 | Status: SHIPPED | OUTPATIENT
Start: 2019-03-25 | End: 2019-04-21 | Stop reason: SDUPTHER

## 2019-04-21 DIAGNOSIS — E11.9 DIABETES MELLITUS WITHOUT COMPLICATION: ICD-10-CM

## 2019-04-22 RX ORDER — SITAGLIPTIN 100 MG/1
TABLET, FILM COATED ORAL
Qty: 30 TABLET | Refills: 0 | Status: SHIPPED | OUTPATIENT
Start: 2019-04-22 | End: 2019-05-19 | Stop reason: SDUPTHER

## 2019-05-19 DIAGNOSIS — E11.9 DIABETES MELLITUS WITHOUT COMPLICATION: ICD-10-CM

## 2019-05-20 RX ORDER — SITAGLIPTIN 100 MG/1
TABLET, FILM COATED ORAL
Qty: 30 TABLET | Refills: 0 | Status: SHIPPED | OUTPATIENT
Start: 2019-05-20 | End: 2019-06-16 | Stop reason: SDUPTHER

## 2019-06-03 ENCOUNTER — OFFICE VISIT (OUTPATIENT)
Dept: CARDIOLOGY | Facility: CLINIC | Age: 59
End: 2019-06-03
Attending: INTERNAL MEDICINE
Payer: COMMERCIAL

## 2019-06-03 VITALS
HEIGHT: 62 IN | WEIGHT: 175 LBS | BODY MASS INDEX: 32.2 KG/M2 | HEART RATE: 68 BPM | SYSTOLIC BLOOD PRESSURE: 128 MMHG | DIASTOLIC BLOOD PRESSURE: 77 MMHG

## 2019-06-03 DIAGNOSIS — I51.9 LV DYSFUNCTION: ICD-10-CM

## 2019-06-03 DIAGNOSIS — E11.9 TYPE 2 DIABETES MELLITUS WITHOUT COMPLICATION, WITHOUT LONG-TERM CURRENT USE OF INSULIN: ICD-10-CM

## 2019-06-03 DIAGNOSIS — I25.2 OLD MYOCARDIAL INFARCTION: ICD-10-CM

## 2019-06-03 DIAGNOSIS — Z95.810 AICD (AUTOMATIC CARDIOVERTER/DEFIBRILLATOR) PRESENT: ICD-10-CM

## 2019-06-03 DIAGNOSIS — I25.10 ATHEROSCLEROSIS OF NATIVE CORONARY ARTERY OF NATIVE HEART WITHOUT ANGINA PECTORIS: Primary | ICD-10-CM

## 2019-06-03 PROCEDURE — 93283 PRGRMG EVAL IMPLANTABLE DFB: CPT | Mod: S$GLB,,, | Performed by: INTERNAL MEDICINE

## 2019-06-03 PROCEDURE — 99213 OFFICE O/P EST LOW 20 MIN: CPT | Mod: S$GLB,,, | Performed by: INTERNAL MEDICINE

## 2019-06-03 PROCEDURE — 93283 PR PROGRAM EVAL (IN PERSON) IMPLANT DEVICE,CARDVERT/DEFIB,2 LEAD: ICD-10-PCS | Mod: S$GLB,,, | Performed by: INTERNAL MEDICINE

## 2019-06-03 PROCEDURE — 99213 PR OFFICE/OUTPT VISIT, EST, LEVL III, 20-29 MIN: ICD-10-PCS | Mod: S$GLB,,, | Performed by: INTERNAL MEDICINE

## 2019-06-04 NOTE — PROGRESS NOTES
Subjective:    Patient ID:  Graciela Rhodes is a 59 y.o. female     HPI   Here for F/U of CAD, previous anterior MI, LV dysfunction, AICD placement, diabetes mellitus, hypothyroidism.    I feel well. I am active. No complaints.    Current Outpatient Medications   Medication Sig    ACCU-CHEK MICHELLE PLUS Strp     ACCU-CHEK MULTICLIX LANCET lancets     aspirin (ECOTRIN) 81 MG EC tablet Take by mouth once daily.    clopidogrel (PLAVIX) 75 mg tablet Take 75 mg by mouth once daily.    furosemide (LASIX) 40 MG tablet TAKE 1 TABLET(40 MG) BY MOUTH EVERY DAY    furosemide (LASIX) 40 MG tablet TAKE 1 TABLET(40 MG) BY MOUTH EVERY DAY    ibuprofen (ADVIL,MOTRIN) 600 MG tablet Take 1 tablet (600 mg total) by mouth every 6 (six) hours as needed for Pain.    JANUVIA 100 mg Tab TAKE 1 TABLET BY MOUTH EVERY DAY    levothyroxine (SYNTHROID) 75 MCG tablet     metFORMIN (GLUCOPHAGE) 1000 MG tablet TAKE 1 TABLET BY MOUTH TWICE DAILY    metFORMIN (GLUCOPHAGE) 1000 MG tablet TAKE 1 TABLET BY MOUTH TWICE DAILY    metoprolol tartrate (LOPRESSOR) 25 MG tablet Take 25 mg by mouth 2 (two) times daily.    ramipril (ALTACE) 2.5 MG capsule     rosuvastatin (CRESTOR) 20 MG tablet Take 20 mg by mouth once daily.     No current facility-administered medications for this visit.          Review of Systems   Constitution: Negative for chills, decreased appetite, fever, weight gain and weight loss.   HENT: Negative for congestion, hearing loss and sore throat.    Eyes: Negative for blurred vision, double vision and visual disturbance.   Cardiovascular: Negative for chest pain, claudication, dyspnea on exertion, leg swelling, palpitations and syncope.   Respiratory: Negative for cough, hemoptysis, shortness of breath, sputum production and wheezing.    Endocrine: Negative for cold intolerance and heat intolerance.   Hematologic/Lymphatic: Negative for bleeding problem. Does not bruise/bleed easily.   Skin: Negative for color change, dry  "skin, flushing and itching.   Musculoskeletal: Negative for back pain, joint pain and myalgias.   Gastrointestinal: Negative for abdominal pain, anorexia, constipation, diarrhea, dysphagia, nausea and vomiting.        No bleeding per rectum   Genitourinary: Negative for dysuria, flank pain, frequency, hematuria and nocturia.   Neurological: Negative for dizziness, headaches, light-headedness, loss of balance, seizures and tremors.   Psychiatric/Behavioral: Negative for altered mental status and depression.         Vitals:    06/03/19 1035   BP: 128/77   Pulse: 68   Weight: 79.4 kg (175 lb)   Height: 5' 2" (1.575 m)     Objective:    Physical Exam   Constitutional: She is oriented to person, place, and time. She appears well-developed and well-nourished.   HENT:   Head: Normocephalic and atraumatic.   Nose: Nose normal.   Mouth/Throat: Oropharynx is clear and moist.   Eyes: Pupils are equal, round, and reactive to light. Conjunctivae and EOM are normal.   Neck: Neck supple. No tracheal deviation present. No thyromegaly present.   Cardiovascular: Normal rate, regular rhythm and intact distal pulses. Exam reveals no gallop and no friction rub.   No murmur heard.  Pulmonary/Chest: No respiratory distress. She has no wheezes. She has no rales. She exhibits no tenderness.   Abdominal: Soft. Bowel sounds are normal. She exhibits no distension and no mass. There is no tenderness. There is no rebound and no guarding.   Musculoskeletal: Normal range of motion.   Lymphadenopathy:     She has no cervical adenopathy.   Neurological: She is alert and oriented to person, place, and time.   Skin: Skin is warm and dry.   Psychiatric: Her behavior is normal.         Assessment:       1. Atherosclerosis of native coronary artery of native heart without angina pectoris    2. Old myocardial infarction    3. AICD (automatic cardioverter/defibrillator) present    4. Type 2 diabetes mellitus without complication, without long-term current " use of insulin    5. LV dysfunction    6.      Hypothyroidism.     Plan:       Stable  Continue the same medications.

## 2019-06-16 DIAGNOSIS — E11.9 DIABETES MELLITUS WITHOUT COMPLICATION: ICD-10-CM

## 2019-06-17 RX ORDER — SITAGLIPTIN 100 MG/1
TABLET, FILM COATED ORAL
Qty: 30 TABLET | Refills: 0 | Status: SHIPPED | OUTPATIENT
Start: 2019-06-17 | End: 2019-07-15 | Stop reason: SDUPTHER

## 2019-07-15 DIAGNOSIS — E11.9 DIABETES MELLITUS WITHOUT COMPLICATION: ICD-10-CM

## 2019-07-15 RX ORDER — SITAGLIPTIN 100 MG/1
TABLET, FILM COATED ORAL
Qty: 30 TABLET | Refills: 0 | Status: SHIPPED | OUTPATIENT
Start: 2019-07-15

## 2019-07-25 DIAGNOSIS — E11.9 DIABETES MELLITUS WITHOUT COMPLICATION: ICD-10-CM

## 2019-07-25 RX ORDER — SITAGLIPTIN 100 MG/1
TABLET, FILM COATED ORAL
Qty: 30 TABLET | Refills: 0 | Status: ON HOLD | OUTPATIENT
Start: 2019-07-25 | End: 2023-10-01 | Stop reason: HOSPADM

## 2019-10-07 ENCOUNTER — CLINICAL SUPPORT (OUTPATIENT)
Dept: CARDIOLOGY | Facility: CLINIC | Age: 59
End: 2019-10-07
Attending: INTERNAL MEDICINE
Payer: COMMERCIAL

## 2019-10-07 VITALS
BODY MASS INDEX: 32.76 KG/M2 | WEIGHT: 178 LBS | HEART RATE: 63 BPM | HEIGHT: 62 IN | DIASTOLIC BLOOD PRESSURE: 74 MMHG | SYSTOLIC BLOOD PRESSURE: 124 MMHG

## 2019-10-07 DIAGNOSIS — Z95.810 AICD (AUTOMATIC CARDIOVERTER/DEFIBRILLATOR) PRESENT: ICD-10-CM

## 2019-10-07 DIAGNOSIS — I51.9 LV DYSFUNCTION: ICD-10-CM

## 2019-10-07 DIAGNOSIS — I25.2 OLD MYOCARDIAL INFARCTION: ICD-10-CM

## 2019-10-07 DIAGNOSIS — Z95.810 AICD (AUTOMATIC CARDIOVERTER/DEFIBRILLATOR) PRESENT: Primary | ICD-10-CM

## 2019-10-07 DIAGNOSIS — E11.9 TYPE 2 DIABETES MELLITUS WITHOUT COMPLICATION, WITHOUT LONG-TERM CURRENT USE OF INSULIN: ICD-10-CM

## 2019-10-07 DIAGNOSIS — I25.10 ATHEROSCLEROSIS OF NATIVE CORONARY ARTERY OF NATIVE HEART WITHOUT ANGINA PECTORIS: Primary | ICD-10-CM

## 2019-10-07 PROCEDURE — 93289 INTERROG DEVICE EVAL HEART: CPT | Mod: S$GLB,,, | Performed by: INTERNAL MEDICINE

## 2019-10-07 PROCEDURE — 99214 PR OFFICE/OUTPT VISIT, EST, LEVL IV, 30-39 MIN: ICD-10-PCS | Mod: S$GLB,,, | Performed by: INTERNAL MEDICINE

## 2019-10-07 PROCEDURE — 99214 OFFICE O/P EST MOD 30 MIN: CPT | Mod: S$GLB,,, | Performed by: INTERNAL MEDICINE

## 2019-10-07 PROCEDURE — 93289 CARDIAC DEVICE CHECK - IN CLINIC & HOSPITAL: ICD-10-PCS | Mod: S$GLB,,, | Performed by: INTERNAL MEDICINE

## 2019-10-07 RX ORDER — METOPROLOL SUCCINATE 50 MG/1
50 TABLET, EXTENDED RELEASE ORAL DAILY
Qty: 30 TABLET | Refills: 6 | Status: SHIPPED | OUTPATIENT
Start: 2019-10-07 | End: 2020-05-07

## 2019-10-07 RX ORDER — METOPROLOL SUCCINATE 50 MG/1
50 TABLET, EXTENDED RELEASE ORAL DAILY
COMMUNITY
End: 2019-10-07 | Stop reason: SDUPTHER

## 2019-10-07 NOTE — PROGRESS NOTES
Subjective:    Patient ID:  Graciela Rhodes is a 59 y.o. female     HPI  Here for F/U of CAD, previous anterior MI, LV dysfunction, (last echo on 9/2017, EF = 35-40%), AICD placement, diabetes mellitus, hypothyroidism.    I feel well.  Have no complaints.  Dr. Hess recently had blood work drawn and this was all normal.    Current Outpatient Medications   Medication Sig    ACCU-CHEK MICHELLE PLUS Strp     ACCU-CHEK MULTICLIX LANCET lancets     aspirin (ECOTRIN) 81 MG EC tablet Take by mouth once daily.    clopidogrel (PLAVIX) 75 mg tablet Take 75 mg by mouth once daily.    furosemide (LASIX) 40 MG tablet TAKE 1 TABLET(40 MG) BY MOUTH EVERY DAY    furosemide (LASIX) 40 MG tablet TAKE 1 TABLET(40 MG) BY MOUTH EVERY DAY    ibuprofen (ADVIL,MOTRIN) 600 MG tablet Take 1 tablet (600 mg total) by mouth every 6 (six) hours as needed for Pain.    JANUVIA 100 mg Tab TAKE 1 TABLET BY MOUTH EVERY DAY    JANUVIA 100 mg Tab TAKE 1 TABLET BY MOUTH EVERY DAY    levothyroxine (SYNTHROID) 75 MCG tablet     metFORMIN (GLUCOPHAGE) 1000 MG tablet TAKE 1 TABLET BY MOUTH TWICE DAILY    metFORMIN (GLUCOPHAGE) 1000 MG tablet TAKE 1 TABLET BY MOUTH TWICE DAILY    metoprolol succinate (TOPROL-XL) 50 MG 24 hr tablet Take 1 tablet (50 mg total) by mouth once daily.    metoprolol tartrate (LOPRESSOR) 25 MG tablet Take 25 mg by mouth 2 (two) times daily.    ramipril (ALTACE) 2.5 MG capsule     rosuvastatin (CRESTOR) 20 MG tablet Take 20 mg by mouth once daily.     No current facility-administered medications for this visit.           Review of Systems   Constitution: Negative for chills, decreased appetite, fever, weight gain and weight loss.   HENT: Negative for congestion, hearing loss and sore throat.    Eyes: Negative for blurred vision, double vision and visual disturbance.   Cardiovascular: Negative for chest pain, claudication, dyspnea on exertion, leg swelling, palpitations and syncope.   Respiratory: Negative for  "cough, hemoptysis, shortness of breath, sputum production and wheezing.    Endocrine: Negative for cold intolerance and heat intolerance.   Hematologic/Lymphatic: Negative for bleeding problem. Does not bruise/bleed easily.   Skin: Negative for color change, dry skin, flushing and itching.   Musculoskeletal: Negative for back pain, joint pain and myalgias.   Gastrointestinal: Negative for abdominal pain, anorexia, constipation, diarrhea, dysphagia, nausea and vomiting.        No bleeding per rectum   Genitourinary: Negative for dysuria, flank pain, frequency, hematuria and nocturia.   Neurological: Negative for dizziness, headaches, light-headedness, loss of balance, seizures and tremors.   Psychiatric/Behavioral: Negative for altered mental status and depression.     Vitals:    10/07/19 1127   BP: 124/74   Pulse: 63   Weight: 80.7 kg (178 lb)   Height: 5' 2" (1.575 m)        Objective:    Physical Exam   Constitutional: She is oriented to person, place, and time. She appears well-developed and well-nourished.   HENT:   Head: Normocephalic and atraumatic.   Nose: Nose normal.   Mouth/Throat: Oropharynx is clear and moist.   Eyes: Pupils are equal, round, and reactive to light. Conjunctivae and EOM are normal.   Neck: Neck supple. No tracheal deviation present. No thyromegaly present.   Cardiovascular: Normal rate, regular rhythm and intact distal pulses. Exam reveals no gallop and no friction rub.   No murmur heard.  Pulmonary/Chest: No respiratory distress. She has no wheezes. She has no rales. She exhibits no tenderness.   Abdominal: Soft. Bowel sounds are normal. She exhibits no distension and no mass. There is no tenderness. There is no rebound and no guarding.   Musculoskeletal: Normal range of motion.   Lymphadenopathy:     She has no cervical adenopathy.   Neurological: She is alert and oriented to person, place, and time.   Skin: Skin is warm and dry.   Psychiatric: Her behavior is normal.        AICD " interrogated.      Assessment:       1. Atherosclerosis of native coronary artery of native heart without angina pectoris    2. Old myocardial infarction    3. LV dysfunction    4. AICD (automatic cardioverter/defibrillator) present    5. Type 2 diabetes mellitus without complication, without long-term current use of insulin         Plan:       Discontinue metoprolol tartrate 25 mg  Start metoprolol succinate 50 mg daily  Continue the remaining pharmacological regimen.

## 2019-10-07 NOTE — LETTER
October 7, 2019      Jake Hess MD  [facility] [address]           [facility]  [address] [place] [address], [id number]  [facility] [address]  Phone: [phone]  Fax: [phone]          Patient: Graciela Rhodes   MR Number: [id number]   YOB: 1960   Date of Visit: 10/7/2019       Dear Dr. Jake Hess:    Thank you for referring Graciela Rhodes to me for evaluation. Attached you will find relevant portions of my assessment and plan of care.    If you have questions, please do not hesitate to call me. I look forward to following Graciela Rhodes along with you.    Sincerely,    Seb Anthony MD    Enclosure  CC:  No Recipients    If you would like to receive this communication electronically, please contact [email or web address] or [phone] to request more information on [facility] access.    For providers and/or their staff who would like to refer a patient to [facility], please contact us through our one-stop-shop provider referral line, [facility], at [phone].    If you feel you have received this communication in error or would no longer like to receive these types of communications, please e-mail [email or web address]

## 2019-12-09 ENCOUNTER — OFFICE VISIT (OUTPATIENT)
Dept: CARDIOLOGY | Facility: CLINIC | Age: 59
End: 2019-12-09
Attending: INTERNAL MEDICINE
Payer: COMMERCIAL

## 2019-12-09 VITALS
WEIGHT: 175 LBS | BODY MASS INDEX: 32.2 KG/M2 | DIASTOLIC BLOOD PRESSURE: 65 MMHG | HEIGHT: 62 IN | SYSTOLIC BLOOD PRESSURE: 115 MMHG | HEART RATE: 81 BPM

## 2019-12-09 DIAGNOSIS — I51.9 LV DYSFUNCTION: ICD-10-CM

## 2019-12-09 DIAGNOSIS — I25.2 OLD MYOCARDIAL INFARCTION: ICD-10-CM

## 2019-12-09 DIAGNOSIS — Z95.810 AICD (AUTOMATIC CARDIOVERTER/DEFIBRILLATOR) PRESENT: ICD-10-CM

## 2019-12-09 DIAGNOSIS — E11.9 TYPE 2 DIABETES MELLITUS WITHOUT COMPLICATION, WITHOUT LONG-TERM CURRENT USE OF INSULIN: ICD-10-CM

## 2019-12-09 DIAGNOSIS — Z95.810 AICD (AUTOMATIC CARDIOVERTER/DEFIBRILLATOR) PRESENT: Primary | ICD-10-CM

## 2019-12-09 DIAGNOSIS — I25.10 ATHEROSCLEROSIS OF NATIVE CORONARY ARTERY OF NATIVE HEART WITHOUT ANGINA PECTORIS: Primary | ICD-10-CM

## 2019-12-09 PROCEDURE — 99213 OFFICE O/P EST LOW 20 MIN: CPT | Mod: S$GLB,,, | Performed by: INTERNAL MEDICINE

## 2019-12-09 PROCEDURE — 99213 PR OFFICE/OUTPT VISIT, EST, LEVL III, 20-29 MIN: ICD-10-PCS | Mod: S$GLB,,, | Performed by: INTERNAL MEDICINE

## 2019-12-09 NOTE — PROGRESS NOTES
Subjective:    Patient ID:  Graciela Rhodes is a 59 y.o. female     HPI  Here for F/U of CAD, previous anterior MI, LV dysfunction, (last echo on 9/2017, EF = 35-40%), AICD placement, diabetes mellitus, hypothyroidism.     I feel well.  I have no complaints.  I stay active.  I have lost some weight.    Current Outpatient Medications   Medication Sig    ACCU-CHEK MICHELLE PLUS Strp     ACCU-CHEK MULTICLIX LANCET lancets     aspirin (ECOTRIN) 81 MG EC tablet Take by mouth once daily.    clopidogrel (PLAVIX) 75 mg tablet Take 75 mg by mouth once daily.    furosemide (LASIX) 40 MG tablet TAKE 1 TABLET(40 MG) BY MOUTH EVERY DAY    furosemide (LASIX) 40 MG tablet TAKE 1 TABLET(40 MG) BY MOUTH EVERY DAY    ibuprofen (ADVIL,MOTRIN) 600 MG tablet Take 1 tablet (600 mg total) by mouth every 6 (six) hours as needed for Pain.    JANUVIA 100 mg Tab TAKE 1 TABLET BY MOUTH EVERY DAY    JANUVIA 100 mg Tab TAKE 1 TABLET BY MOUTH EVERY DAY    levothyroxine (SYNTHROID) 75 MCG tablet     metFORMIN (GLUCOPHAGE) 1000 MG tablet TAKE 1 TABLET BY MOUTH TWICE DAILY    metFORMIN (GLUCOPHAGE) 1000 MG tablet TAKE 1 TABLET BY MOUTH TWICE DAILY    metoprolol succinate (TOPROL-XL) 50 MG 24 hr tablet Take 1 tablet (50 mg total) by mouth once daily.    metoprolol tartrate (LOPRESSOR) 25 MG tablet Take 25 mg by mouth 2 (two) times daily.    ramipril (ALTACE) 2.5 MG capsule     rosuvastatin (CRESTOR) 20 MG tablet Take 20 mg by mouth once daily.     No current facility-administered medications for this visit.        Review of Systems   Constitution: Negative for chills, decreased appetite, fever, weight gain and weight loss.   HENT: Negative for congestion, hearing loss and sore throat.    Eyes: Negative for blurred vision, double vision and visual disturbance.   Cardiovascular: Negative for chest pain, claudication, dyspnea on exertion, leg swelling, palpitations and syncope.   Respiratory: Negative for cough, hemoptysis, shortness of  "breath, sputum production and wheezing.    Endocrine: Negative for cold intolerance and heat intolerance.   Hematologic/Lymphatic: Negative for bleeding problem. Does not bruise/bleed easily.   Skin: Negative for color change, dry skin, flushing and itching.   Musculoskeletal: Negative for back pain, joint pain and myalgias.   Gastrointestinal: Negative for abdominal pain, anorexia, constipation, diarrhea, dysphagia, nausea and vomiting.        No bleeding per rectum   Genitourinary: Negative for dysuria, flank pain, frequency, hematuria and nocturia.   Neurological: Negative for dizziness, headaches, light-headedness, loss of balance, seizures and tremors.   Psychiatric/Behavioral: Negative for altered mental status and depression.         Vitals:    12/09/19 1057   BP: 115/65   Pulse: 81   Weight: 79.4 kg (175 lb)   Height: 5' 2" (1.575 m)     Objective:    Physical Exam   Constitutional: She is oriented to person, place, and time. She appears well-developed and well-nourished.   HENT:   Head: Normocephalic and atraumatic.   Nose: Nose normal.   Mouth/Throat: Oropharynx is clear and moist.   Eyes: Pupils are equal, round, and reactive to light. Conjunctivae and EOM are normal.   Neck: Neck supple. No tracheal deviation present. No thyromegaly present.   Cardiovascular: Normal rate, regular rhythm and intact distal pulses. Exam reveals no gallop and no friction rub.   No murmur heard.  Pulmonary/Chest: No respiratory distress. She has no wheezes. She has no rales. She exhibits no tenderness.   Abdominal: Soft. Bowel sounds are normal. She exhibits no distension and no mass. There is no tenderness. There is no rebound and no guarding.   Musculoskeletal: Normal range of motion.   Lymphadenopathy:     She has no cervical adenopathy.   Neurological: She is alert and oriented to person, place, and time.   Skin: Skin is warm and dry.   Psychiatric: Her behavior is normal.         Assessment:       1. Atherosclerosis of " native coronary artery of native heart without angina pectoris    2. Old myocardial infarction    3. LV dysfunction    4. AICD (automatic cardioverter/defibrillator) present    5. Type 2 diabetes mellitus without complication, without long-term current use of insulin         Plan:       Stable.  Continue present pharmacological regimen.

## 2020-02-24 DIAGNOSIS — E11.69 TYPE 2 DIABETES MELLITUS WITH OTHER SPECIFIED COMPLICATION, UNSPECIFIED WHETHER LONG TERM INSULIN USE: Primary | ICD-10-CM

## 2020-03-03 ENCOUNTER — CLINICAL SUPPORT (OUTPATIENT)
Dept: DIABETES | Facility: CLINIC | Age: 60
End: 2020-03-03
Payer: COMMERCIAL

## 2020-03-03 VITALS — WEIGHT: 167.75 LBS | BODY MASS INDEX: 30.87 KG/M2 | HEIGHT: 62 IN

## 2020-03-03 DIAGNOSIS — E11.69 TYPE 2 DIABETES MELLITUS WITH OTHER SPECIFIED COMPLICATION, UNSPECIFIED WHETHER LONG TERM INSULIN USE: ICD-10-CM

## 2020-03-03 PROCEDURE — G0108 DIAB MANAGE TRN  PER INDIV: HCPCS | Mod: ,,, | Performed by: DIETITIAN, REGISTERED

## 2020-03-03 PROCEDURE — G0108 PR DIAB MANAGE TRN  PER INDIV: ICD-10-PCS | Mod: ,,, | Performed by: DIETITIAN, REGISTERED

## 2020-03-03 PROCEDURE — 99212 OFFICE O/P EST SF 10 MIN: CPT | Performed by: DIETITIAN, REGISTERED

## 2020-03-03 NOTE — PROGRESS NOTES
Diabetes Education  Author: Sasha Bower RD  Date: 3/3/2020    Diabetes Care Management Summary  Diabetes Education Record Assessment/Progress: Initial  Current Diabetes Risk Level: High     Last A1c:   Lab Results   Component Value Date    HGBA1C 7.0 (H) 08/10/2009     Last Visit with Diabetes Educator: Last Education Visit: Not Found  Last OPCM Referral: : Not Found   Enrolled in OPCM: No      Diabetes Type  Diabetes Type : Type II    Diabetes History  Current Treatment: Oral Medication, Insulin(2,000 mg metformin and januvia, 10 units of lantus in the am and pn)  Reviewed Problem List with Patient: Yes    Health Maintenance was reviewed today with patient. Discussed with patient importance of routine eye exams, foot exams/foot care, blood work (i.e.: A1c, microalbumin, and lipid), dental visits, yearly flu vaccine, and pneumonia vaccine as indicated by PCP. Patient verbalized understanding.     Health Maintenance Topics with due status: Not Due       Topic Last Completion Date    Mammogram 10/07/2019     Health Maintenance Due   Topic Date Due    Hepatitis C Screening  1960    Foot Exam  05/19/1969    Eye Exam  05/19/1969    TETANUS VACCINE  05/19/1977    Pneumococcal Vaccine (Medium Risk) (1 of 1 - PPSV23) 05/19/1978    Pap Smear with HPV Cotest  05/19/1980    Colonoscopy  05/19/2009    Hemoglobin A1c  02/10/2010    Lipid Panel  08/05/2010    Aspirin/Antiplatelet Therapy  04/09/2014    High Dose Statin  05/22/2018       Nutrition  Meal Planning: water, 3 meals per day, diet drinks, eats out seldom(has made many dietary changes recently , was driniking sweet coffe in am, sandwich or school lunches and cooked dinner and was eating many cookies ,cakes and candies)  What type of sweetener do you use?: Stevia/Truvia  What type of beverages do you drink?: milk, water, diet soda/tea  Meal Plan 24 Hour Recall - Breakfast: 1 boiled egg and lactaid milk and 3 slices of an orange  Meal Plan 24 Hour  Recall - Lunch: sauteed shrimp, asparagus and water  Meal Plan 24 Hour Recall - Dinner: red beans no rice with diet ginger ale  Meal Plan 24 Hour Recall - Snack: stopped snacking    Monitoring   Monitoring: Other  Self Monitoring : SMBG fastin,156,168,145, PP dinner:186, 196,187  Blood Glucose Logs: No  Do you use a personal continuous glucose monitor?: No  In the last month, how often have you had a low blood sugar reaction?: never  Can you tell when your blood sugar is too high?: yes(feeling tired, some blurry vision and dry mouth with some increased thrist)  How do you treat high blood sugar?: insulin    Exercise   Frequency: Never    Current Diabetes Treatment   Current Treatment: Oral Medication, Insulin(2,000 mg metformin and januvia, 10 units of lantus in the am and pn)    Social History  Preferred Learning Method: Face to Face  Primary Support: Spouse, Self  Educational Level: High School  Occupation: teacher assitant at [employer]  Smoking Status: Ex Smoker(quit 10 years ago)  Alcohol Use: Never            DDS-2 Score  ( > 3 = SIGNIFICANT DISTRESS): 2                   Barriers to Change  Barriers to Change: None  Learning Challenges : None    Readiness to Learn   Readiness to Learn : Eager    Cultural Influences  Cultural Influences: No    Diabetes Education Assessment/Progress  Diabetes Disease Process (diabetes disease process and treatment options): Discussion, Individual Session, Needs Instruction    Nutrition (Incorporating nutritional management into one's lifestyle): Discussion, Demonstration, Return Demonstration, Instructed, Needs Instruction, Individual Session, Written Materials Provided(Has limited consumption of carbs in the last several weeks. Ed on carb counting, label reading and meal and snack planning. She agrees to eat 30-45g of carb per meal. Ed on low sat fat diet-Hx of mi )    Physical Activity (incorporating physical activity into one's lifestyle): Discussion, Instructed,  Individual Session, Needs Instruction, Written Materials Provided(She is now attempting to increase her steps at work. She is able to walk for about 15 minutes at work while at recess. Ed on rec of 150 minutes of moderate to intense PA per week. )    Medications (states correct name, dose, onset, peak, duration, side effects & timing of meds): Discussion, Demonstration, Return Demonstration, Instructed, Needs Instruction, Individual Session, Written Materials Provided, Demonstrates Understanding/Competency(verbalizes/demonstrates)(She recently started levemir vial and syringe. She is unsure if she is injection correctly. Ed on proper injection tech with teach back. Provided pt w/many injection oportunities. Ed on site rotation, prper timing of injections and proper sharps dispose. Demonstrated Pen technique. She is interested in changing to an insulin pen at the end of this month. Ed on Novocare insulin coupon.     Monitoring (monitoring blood glucose/other parameters & using results): Discussion, Instructed, Needs Instruction, Individual Session, Written Materials Provided(Pt currently SMBG twice a day fasting and PP dinner. Ed on target BG ranges. BG are improving with insulin initiation. She agrees to bring logs to all clinic visits)    Acute Complications (preventing, detecting, and treating acute complications): Discussion, Instructed, Needs Instruction, Individual Session, Written Materials Provided(Pt admits to hyper s/s. Ed on causes, s/s and Tx for hyper and hypoglycemia. Stressed call provider if 2 or more hypo events in one week. )    Chronic Complications (preventing, detecting, and treating chronic complications): Discussion, Individual Session, Needs Instruction(hx of MI)    Clinical (diabetes, other pertinent medical history, and relevant comorbidities reviewed during visit): Discussion, Instructed, Needs Instruction, Individual Session, Written Materials Provided(States her A1c is 12%. Ed on target  A1c. She is eagar to improve)    Cognitive (knowledge of self-management skills, functional health literacy): Discussion, Instructed, Needs Instruction, Individual Session, Written Materials Provided    Psychosocial (emotional response to diabetes): Discussion, Individual Session, Needs Review    Diabetes Distress and Support Systems: Discussion, Individual Session, Needs Review(She has a supportive  and children)    Behavioral (readiness for change, lifestyle practices, self-care behaviors): Discussion, Instructed, Needs Instruction, Individual Session, Written Materials Provided    Goals  Patient has selected/evaluated goals during today's session: Yes, selected  Healthy Eating: Set(limit carbs to 30-45g per meal)  Start Date: 03/03/20  Target Date: 03/30/20  Monitoring: Set(SMBG BID and bring logs to all clinic visits)  Start Date: 03/03/20  Target Date: 03/30/20  Medications: Set(Cont to inject 10 units of levemir in the am and pm)  Start Date: 03/03/20  Target Date: 03/30/20         Diabetes Care Plan/Intervention  Education Plan/Intervention: Individual Follow-Up DSMT    Diabetes Meal Plan  Restrictions: Restricted Carbohydrate, Low Sodium, Low Fat  Calories: 1600  Carbohydrate Per Meal: 30-45g  Carbohydrate Per Snack : 15-20g  Fat: 64-71g  Protein: 64-72g    Today's Self-Management Care Plan was developed with the patient's input and is based on barriers identified during today's assessment.    The long and short-term goals in the care plan were written with the patient/caregiver's input. The patient has agreed to work toward these goals to improve her overall diabetes control.      The patient received a copy of today's self-management plan and verbalized understanding of the care plan, goals, and all of today's instructions.      The patient was encouraged to communicate with her physician and care team regarding her condition(s) and treatment.  I provided the patient with my contact information  today and encouraged her to contact me via phone or patient portal as needed.     Education Units of Time   Time Spent: 90 min

## 2020-03-30 ENCOUNTER — NUTRITION (OUTPATIENT)
Dept: DIABETES | Facility: CLINIC | Age: 60
End: 2020-03-30
Payer: COMMERCIAL

## 2020-03-30 DIAGNOSIS — E11.69 TYPE 2 DIABETES MELLITUS WITH OTHER SPECIFIED COMPLICATION, UNSPECIFIED WHETHER LONG TERM INSULIN USE: Primary | ICD-10-CM

## 2020-03-30 PROCEDURE — G0108 DIAB MANAGE TRN  PER INDIV: HCPCS | Mod: ,,, | Performed by: DIETITIAN, REGISTERED

## 2020-03-30 PROCEDURE — G0108 PR DIAB MANAGE TRN  PER INDIV: ICD-10-PCS | Mod: ,,, | Performed by: DIETITIAN, REGISTERED

## 2020-03-30 NOTE — PROGRESS NOTES
Diabetes Care Specialist Telehealth Visit Note    This visit was conducted using Telehealth/Telephonic Technology.  It was in the patient's best interest to receive diabetes self-management education and support services in this format to prevent the exposure to COVID-19.        Diabetes Education  Author: Sasha Bower RD  Date: 3/30/2020    Diabetes Care Management Summary  Diabetes Education Record Assessment/Progress: Comprehensive/Group     Last A1c:   Lab Results   Component Value Date    HGBA1C 7.0 (H) 08/10/2009     Last Visit with Diabetes Educator: Last Education Visit: Not Found  Last OPCM Referral: : Not Found   Enrolled in OPCM: No      Diabetes Type  Diabetes Type : Type II    Diabetes History  Current Treatment: Insulin, Oral Medication(januvia, )    Health Maintenance was reviewed today with patient. Discussed with patient importance of routine eye exams, foot exams/foot care, blood work (i.e.: A1c, microalbumin, and lipid), dental visits, yearly flu vaccine, and pneumonia vaccine as indicated by PCP. Patient verbalized understanding.     Health Maintenance Topics with due status: Not Due       Topic Last Completion Date    Mammogram 10/07/2019     Health Maintenance Due   Topic Date Due    Hepatitis C Screening  1960    Foot Exam  05/19/1969    Eye Exam  05/19/1969    TETANUS VACCINE  05/19/1977    Pneumococcal Vaccine (Medium Risk) (1 of 1 - PPSV23) 05/19/1978    Pap Smear with HPV Cotest  05/19/1980    Colonoscopy  05/19/2009    Hemoglobin A1c  02/10/2010    Lipid Panel  08/05/2010    Aspirin/Antiplatelet Therapy  04/09/2014    High Dose Statin  05/22/2018       Nutrition  What type of beverages do you drink?: milk, water, diet soda/tea(diet haylee dry)  Meal Plan 24 Hour Recall - Breakfast: 1 boiled egg and lactaid milk and 3 slices of an orange or cottage cheese and blueberries  Meal Plan 24 Hour Recall - Lunch: leftovers or shrimp and vegetables(nonstarchy)  Meal Plan 24  Hour Recall - Dinner: chicken gumbo with 1/3 cup of brown rice  Meal Plan 24 Hour Recall - Snack: dried fruit or sunflower seeds    Monitoring   Self Monitoring : fastin, 144, 132 PP dinner: [phone]  Blood Glucose Logs: Yes  In the last month, how often have you had a low blood sugar reaction?: never         Current Diabetes Treatment   Current Treatment: Insulin, Oral Medication(januvia, )           Barriers to Change  Barriers to Change: None  Learning Challenges : None              Diabetes Education Assessment/Progress  Diabetes Disease Process (diabetes disease process and treatment options): Discussion, Individual Session, Needs Instruction    Nutrition (Incorporating nutritional management into one's lifestyle): Instructed, Discussion, Needs Review, Individual Session(She cont to eat a limited carb low sat fat diet. She has lost 10lbs!)    Physical Activity (incorporating physical activity into one's lifestyle): Needs Review, Instructed, Discussion, Individual Session(15 minute walk outside and working cleaning around the house to stay active. She is afraid to walk around people and is walking in her back ally. )    Medications (states correct name, dose, onset, peak, duration, side effects & timing of meds): Discussion, Needs Review, Individual Session, Instructed(She cont to use vial and syringe bc she cannot get intouch with her provider. She injects 10 unit of longacting in the am and pm. injects in her thighs, cont to use magnifying glass. Suggested she increase to 12 units in PM to improve fasting BG. )    Monitoring (monitoring blood glucose/other parameters & using results): Instructed, Needs Instruction, Discussion, Individual Session(Reviewed her BG log via the phone. She is SMBG fasting and PP dinner. Eveing numbers wnl, fasting are slightly elevated.  She agrees to iincrease insulin by 2units in the eveing only. )    Acute Complications (preventing, detecting, and treating acute  complications): Discussion, Instructed, Needs Review, Individual Session(Blurry vision has improved and is feeling more energetic, no hypoglycemia)    Chronic Complications (preventing, detecting, and treating chronic complications): Discussion, Individual Session, Needs Instruction    Clinical (diabetes, other pertinent medical history, and relevant comorbidities reviewed during visit): Instructed, Needs Review, Discussion, Individual Session(lost 10lbs!, Ed on benefits of weight loss)    Cognitive (knowledge of self-management skills, functional health literacy): Comprehends Key Points, Discussion, Individual Session    Psychosocial (emotional response to diabetes): Discussion, Individual Session, Needs Review, Instructed(She is working hard to improve her BG and overall health)    Diabetes Distress and Support Systems: Instructed, Needs Review, Individual Session, Discussion(Her  is very supportive. He is running errands for her to avoid her leaving the house during the Covid-19 pandemic. She is afraid to be around other people bc of her underlying health issues. She has a positive attitude)    Behavioral (readiness for change, lifestyle practices, self-care behaviors): Discussion, Needs Review, Individual Session, Instructed    Goals  Patient has selected/evaluated goals during today's session: Yes, evaluated  Healthy Eating: In Progress  Monitoring: In Progress  Medications: % Met  Met Percentage : 100%         Diabetes Care Plan/Intervention  Education Plan/Intervention: Individual Follow-Up DSMT    Diabetes Meal Plan  Restrictions: Restricted Carbohydrate, Low Sodium, Low Fat  Calories: 1600  Carbohydrate Per Meal: 30-45g  Carbohydrate Per Snack : 15-20g  Fat: 64-71g  Protein: 64-72g    Today's Self-Management Care Plan was developed with the patient's input and is based on barriers identified during today's assessment.    The long and short-term goals in the care plan were written with the  patient/caregiver's input. The patient has agreed to work toward these goals to improve her overall diabetes control.      The patient received a copy of today's self-management plan and verbalized understanding of the care plan, goals, and all of today's instructions.      The patient was encouraged to communicate with her physician and care team regarding her condition(s) and treatment.  I provided the patient with my contact information today and encouraged her to contact me via phone or patient portal as needed.     Education Units of Time   Time Spent: 60 min

## 2020-05-07 RX ORDER — METOPROLOL SUCCINATE 50 MG/1
TABLET, EXTENDED RELEASE ORAL
Qty: 30 TABLET | Refills: 6 | Status: SHIPPED | OUTPATIENT
Start: 2020-05-07

## 2020-05-26 ENCOUNTER — NUTRITION (OUTPATIENT)
Dept: DIABETES | Facility: CLINIC | Age: 60
End: 2020-05-26
Payer: COMMERCIAL

## 2020-05-26 DIAGNOSIS — E11.69 TYPE 2 DIABETES MELLITUS WITH OTHER SPECIFIED COMPLICATION, UNSPECIFIED WHETHER LONG TERM INSULIN USE: Primary | ICD-10-CM

## 2020-05-26 PROCEDURE — G0108 DIAB MANAGE TRN  PER INDIV: HCPCS | Mod: 95,,, | Performed by: DIETITIAN, REGISTERED

## 2020-05-26 PROCEDURE — G0108 PR DIAB MANAGE TRN  PER INDIV: ICD-10-PCS | Mod: 95,,, | Performed by: DIETITIAN, REGISTERED

## 2020-05-27 NOTE — PROGRESS NOTES
Diabetes Care Specialist Telehealth Visit Note     It was in the patient's best interest to receive diabetes self-management education and support services in this format to prevent the exposure to COVID-19.        Established Patient - Audio Only Telehealth Visit  The patient location is: home   The chief complaint leading to consultation is: Diabetes    Visit type: Virtual visit with audio only (telephone)  Total time spent with patient: 30 min      The reason for the audio only service rather than synchronous audio and video virtual visit was related to technical difficulties or patient preference/necessity.     Each patient to whom I provide medical services by telemedicine is:  (1) informed of the relationship between the physician and patient and the respective role of any other health care provider with respect to management of the patient; and (2) notified that they may decline to receive medical services by telemedicine and may withdraw from such care at any time. Patient verbally consented to receive this service via voice-only telephone call.        Diabetes Education  Author: Sasha Bower RD  Date: 5/27/2020    Diabetes Care Management Summary  Diabetes Education Record Assessment/Progress: Comprehensive/Group  Current Diabetes Risk Level: Moderate     Last A1c:   Lab Results   Component Value Date    HGBA1C 7.0 (H) 08/10/2009     Last visit with Diabetes Educator: Last Education Visit: Not Found      Diabetes Type  Diabetes Type : Type II         Health Maintenance was reviewed today with patient. Discussed with patient importance of routine eye exams, foot exams/foot care, blood work (i.e.: A1c, microalbumin, and lipid), dental visits, yearly flu vaccine, and pneumonia vaccine as indicated by PCP. Patient verbalized understanding.     Health Maintenance Topics with due status: Not Due       Topic Last Completion Date    Influenza Vaccine 09/11/2018    Mammogram 10/07/2019     Health Maintenance  Due   Topic Date Due    Hepatitis C Screening  1960    Foot Exam  1969    Eye Exam  1969    TETANUS VACCINE  1977    Pneumococcal Vaccine (Medium Risk) (1 of 1 - PPSV23) 1978    Pap Smear with HPV Cotest  1980    Colonoscopy  2009    Hemoglobin A1c  02/10/2010    Lipid Panel  2010    Aspirin/Antiplatelet Therapy  2014    High Dose Statin  2018       Nutrition  What type of sweetener do you use?: Stevia/Truvia  Meal Plan 24 Hour Recall - Breakfast: 1 boiled egg and lactaid milk and 3 slices of an orange or cottage cheese and blueberries  Meal Plan 24 Hour Recall - Lunch: lima beans pork chop and milk  Meal Plan 24 Hour Recall - Dinner: Red beans 1/3 cup of brown rice, 1 slice of bread with margarine  Meal Plan 24 Hour Recall - Snack: nuts    Monitoring   Self Monitoring : fastin,125,134,117,108,92,116,135 PP dinner:178,168,136,154,155,162,143  Blood Glucose Logs: Yes                                               Barriers to Change  Barriers to Change: None  Learning Challenges : None              Diabetes Education Assessment/Progress  Nutrition (Incorporating nutritional management into one's lifestyle): Discussion, Needs Review, Individual Session, Instructed(She cont to limit carbs and sat fat in diet. She has changed to margarine. )    Physical Activity (incorporating physical activity into one's lifestyle): Discussion, Needs Review, Individual Session, Instructed(She cont to walk at a moderate pace in her back ally for 15-20 minutes daily. Encouraged her to increase her time to meet the rec 150 minutes of exercise per week)    Medications (states correct name, dose, onset, peak, duration, side effects & timing of meds): Discussion, Needs Review, Individual Session, Instructed(She has increased her Latus to 12 unit from 10 with improved BG. She is still using the vial and syringe. Ed on online insulin discounts provided by [employer] and  [employer]. she will discuss this with Dr. Zhang, she also started taking Vit C)    Monitoring (monitoring blood glucose/other parameters & using results): Discussion, Needs Review, Individual Session, Instructed(Reviewed log, Bg improved with 2 additional units)    Acute Complications (preventing, detecting, and treating acute complications): Discussion, Individual Session, Comprehends Key Points(No s/s of hypo or hyperglycemia)    Chronic Complications (preventing, detecting, and treating chronic complications): Discussion, Individual Session, Needs Instruction, Instructed(Ed on ADA SOC, she regularly attends  apturszula. Ed on need for annual dilated  eye exam and biannual dental cleaning)    Psychosocial (emotional response to diabetes): Discussion, Individual Session, Comprehends Key Points    Diabetes Distress and Support Systems: Discussion, Individual Session, Comprehends Key Points    Behavioral (readiness for change, lifestyle practices, self-care behaviors): Discussion, Needs Review, Individual Session, Instructed    Goals  Patient has selected/evaluated goals during today's session: Yes, evaluated  Healthy Eating: In Progress  Monitoring: In Progress         Diabetes Care Plan/Intervention  Education Plan/Intervention: Individual Follow-Up DSMT(She will call to schedule her f/u apmt)    Diabetes Meal Plan  Restrictions: Restricted Carbohydrate, Low Sodium, Low Fat  Calories: 1600  Carbohydrate Per Meal: 30-45g  Carbohydrate Per Snack : 15-20g  Fat: 64-71g  Protein: 64-72g    Today's Self-Management Care Plan was developed with the patient's input and is based on barriers identified during today's assessment.    The long and short-term goals in the care plan were written with the patient/caregiver's input. The patient has agreed to work toward these goals to improve her overall diabetes control.      The patient received a copy of today's self-management plan and verbalized understanding of the care plan,  goals, and all of today's instructions.      The patient was encouraged to communicate with her physician and care team regarding her condition(s) and treatment.  I provided the patient with my contact information today and encouraged her to contact me via phone or patient portal as needed.     Education Units of Time   Time Spent: 30 min

## 2021-01-12 ENCOUNTER — CLINICAL SUPPORT (OUTPATIENT)
Dept: URGENT CARE | Facility: CLINIC | Age: 61
End: 2021-01-12
Payer: COMMERCIAL

## 2021-01-12 DIAGNOSIS — Z11.9 SCREENING EXAMINATION FOR UNSPECIFIED INFECTIOUS DISEASE: Primary | ICD-10-CM

## 2021-01-12 LAB
CTP QC/QA: YES
SARS-COV-2 RDRP RESP QL NAA+PROBE: NEGATIVE

## 2021-01-12 PROCEDURE — U0002 COVID-19 LAB TEST NON-CDC: HCPCS | Mod: QW,S$GLB,, | Performed by: NURSE PRACTITIONER

## 2021-01-12 PROCEDURE — U0002: ICD-10-PCS | Mod: QW,S$GLB,, | Performed by: NURSE PRACTITIONER

## 2021-01-12 PROCEDURE — 99211 PR OFFICE/OUTPT VISIT, EST, LEVL I: ICD-10-PCS | Mod: S$GLB,,, | Performed by: NURSE PRACTITIONER

## 2021-01-12 PROCEDURE — 99211 OFF/OP EST MAY X REQ PHY/QHP: CPT | Mod: S$GLB,,, | Performed by: NURSE PRACTITIONER

## 2021-08-02 NOTE — H&P
[facility]  Cardiology  History and Physical     Patient Name: Graciela Rhodes  MRN: [id number]  Admission Date: 2017  Code Status: No Order   Attending Provider: Garrett Watts MD   Primary Care Physician: Jake Hess MD  Principal Problem:<principal problem not specified>    Patient information was obtained from patient and past medical records.     Subjective:     Chief Complaint:   ICD battery depletion    HPI: Ms. Rhodes is a 56 year old female patient of Dr. Anthony with CAD, s/p STEMI, HFrEF ischemic etiology, NYHA II, referred for Medtronic ICD gen change. Denies chest pain, PND, orthopnea. Stable FAM at 1 block. ICD for primary prevention, no ICD therapies received. No fever, chills, infection.     Past Medical History:   Diagnosis Date    Cardiac defibrillator in place 10/06/2009    serial # [id number],  model # [id number]    CHF (congestive heart failure)     Diabetes mellitus     Hypertension     MI (myocardial infarction)        Past Surgical History:   Procedure Laterality Date    CARDIAC CATHETERIZATION      CARDIAC DEFIBRILLATOR PLACEMENT  10/06/2009     SECTION      X's 3    HERNIA REPAIR      umbilical, X's 2, last done in        Review of patient's allergies indicates:   Allergen Reactions    Ciprofloxacin Rash       No current facility-administered medications on file prior to encounter.      Current Outpatient Prescriptions on File Prior to Encounter   Medication Sig    aspirin (ECOTRIN) 81 MG EC tablet Take by mouth once daily.    furosemide (LASIX) 40 MG tablet Take 1 tablet (40 mg total) by mouth once daily.    levothyroxine (SYNTHROID) 75 MCG tablet     metoprolol tartrate (LOPRESSOR) 25 MG tablet Take 25 mg by mouth 2 (two) times daily.    ramipril (ALTACE) 2.5 MG capsule     SITagliptan (JANUVIA) 100 MG Tab Take 100 mg by mouth once daily.    ACCU-CHEK MICHELLE PLUS Strp     ACCU-CHEK MULTICLIX LANCET lancets     clopidogrel  Rooming documentation of social history includes tobacco screening only.      The vital signs were obtained by NATALIE CRAIG        (PLAVIX) 75 mg tablet Take 75 mg by mouth once daily.    ibuprofen (ADVIL,MOTRIN) 600 MG tablet Take 1 tablet (600 mg total) by mouth every 6 (six) hours as needed for Pain.    metformin (GLUCOPHAGE) 1000 MG tablet Take 1,000 mg by mouth 2 (two) times daily with meals.     Family History     None        Social History Main Topics    Smoking status: Former Smoker     Quit date: 2009    Smokeless tobacco: Not on file    Alcohol use No    Drug use: No    Sexual activity: Not on file     Review of Systems   All other systems reviewed and are negative.    Objective:     Vital Signs (Most Recent):  Temp: 98 °F (36.7 °C) (05/24/17 1150)  Pulse: (!) 59 (05/24/17 1150)  Resp: 18 (05/24/17 1150)  BP: 114/64 (05/24/17 1150)  SpO2: 98 % (05/24/17 1150) Vital Signs (24h Range):  Temp:  [98 °F (36.7 °C)] 98 °F (36.7 °C)  Pulse:  [59] 59  Resp:  [18] 18  SpO2:  [98 %] 98 %  BP: (114)/(64) 114/64     Weight: 82.6 kg (182 lb)  Body mass index is 33.29 kg/m².    SpO2: 98 %       No intake or output data in the 24 hours ending 05/24/17 1232    Lines/Drains/Airways          No matching active lines, drains, or airways          Physical Exam   Constitutional: She is oriented to person, place, and time. She appears well-developed and well-nourished.   HENT:   Head: Normocephalic and atraumatic.   Eyes: Conjunctivae and EOM are normal. Pupils are equal, round, and reactive to light.   Neck: Normal range of motion. Neck supple.   Cardiovascular: Normal rate, regular rhythm, normal heart sounds and intact distal pulses.    Pulmonary/Chest: Effort normal and breath sounds normal.   Abdominal: Soft. Bowel sounds are normal.   Musculoskeletal: Normal range of motion.   Neurological: She is alert and oriented to person, place, and time.   Skin: Skin is warm and dry.   Psychiatric: She has a normal mood and affect.     ASA: 2, Malampati 2    Significant Labs: BMP: No results for input(s): GLU, NA, K, CL, CO2, BUN, CREATININE, CALCIUM, MG  in the last 48 hours., CMP No results for input(s): NA, K, CL, CO2, GLU, BUN, CREATININE, CALCIUM, PROT, ALBUMIN, BILITOT, ALKPHOS, AST, ALT, ANIONGAP, ESTGFRAFRICA, EGFRNONAA in the last 48 hours., CBC No results for input(s): WBC, HGB, HCT, PLT in the last 48 hours. and INR No results for input(s): INR, PROTIME in the last 48 hours.    Significant Imaging: EKG: sinus  Assessment and Plan:     Active Diagnoses:    Diagnosis Date Noted POA    ICD (implantable cardioverter-defibrillator) battery depletion [Z45.02] 05/24/2017 Not Applicable      Problems Resolved During this Admission:    Diagnosis Date Noted Date Resolved POA       VTE Risk Mitigation     None        Ms. Rhodes is a 56 year old female patient of Dr. Anthony with CAD, s/p STEMI, HFrEF, with Medtronic dual chamber ICD at [facility]. No fever, chills, infection. R/B/A of gen change discussed with patient and family. Written informed consents obtained during clinic visit on chart. Plan to proceed with ICD generator replacement with preoperative ABx and with moderate RN sedation.     Payton Devries NP  Cardiology   [facility]

## 2023-09-29 ENCOUNTER — HOSPITAL ENCOUNTER (OUTPATIENT)
Facility: OTHER | Age: 63
Discharge: HOME | End: 2023-10-01
Attending: EMERGENCY MEDICINE | Admitting: EMERGENCY MEDICINE
Payer: COMMERCIAL

## 2023-09-29 DIAGNOSIS — L50.9 URTICARIA: ICD-10-CM

## 2023-09-29 DIAGNOSIS — R55 NEAR SYNCOPE: Primary | ICD-10-CM

## 2023-09-29 DIAGNOSIS — B34.9 VIRAL SYNDROME: ICD-10-CM

## 2023-09-29 DIAGNOSIS — R79.89 ELEVATED LACTIC ACID LEVEL: ICD-10-CM

## 2023-09-29 LAB
BASOPHILS # BLD AUTO: 0.02 K/UL (ref 0–0.2)
BASOPHILS NFR BLD: 0.2 % (ref 0–1.9)
DIFFERENTIAL METHOD: ABNORMAL
EOSINOPHIL # BLD AUTO: 0.6 K/UL (ref 0–0.5)
EOSINOPHIL NFR BLD: 6.5 % (ref 0–8)
ERYTHROCYTE [DISTWIDTH] IN BLOOD BY AUTOMATED COUNT: 17.7 % (ref 11.5–14.5)
HCT VFR BLD AUTO: 38.8 % (ref 37–48.5)
HGB BLD-MCNC: 12.1 G/DL (ref 12–16)
IMM GRANULOCYTES # BLD AUTO: 0.06 K/UL (ref 0–0.04)
IMM GRANULOCYTES NFR BLD AUTO: 0.6 % (ref 0–0.5)
LDH SERPL L TO P-CCNC: 3.48 MMOL/L (ref 0.5–2.2)
LYMPHOCYTES # BLD AUTO: 0.9 K/UL (ref 1–4.8)
LYMPHOCYTES NFR BLD: 9 % (ref 18–48)
MCH RBC QN AUTO: 24.4 PG (ref 27–31)
MCHC RBC AUTO-ENTMCNC: 31.2 G/DL (ref 32–36)
MCV RBC AUTO: 78 FL (ref 82–98)
MONOCYTES # BLD AUTO: 0.4 K/UL (ref 0.3–1)
MONOCYTES NFR BLD: 4.4 % (ref 4–15)
NEUTROPHILS # BLD AUTO: 7.7 K/UL (ref 1.8–7.7)
NEUTROPHILS NFR BLD: 79.3 % (ref 38–73)
NRBC BLD-RTO: 0 /100 WBC
PLATELET # BLD AUTO: 179 K/UL (ref 150–450)
PMV BLD AUTO: 10 FL (ref 9.2–12.9)
POCT GLUCOSE: 152 MG/DL (ref 70–110)
RBC # BLD AUTO: 4.96 M/UL (ref 4–5.4)
SAMPLE: ABNORMAL
WBC # BLD AUTO: 9.73 K/UL (ref 3.9–12.7)

## 2023-09-29 PROCEDURE — 87389 HIV-1 AG W/HIV-1&-2 AB AG IA: CPT | Performed by: EMERGENCY MEDICINE

## 2023-09-29 PROCEDURE — 84145 PROCALCITONIN (PCT): CPT | Performed by: EMERGENCY MEDICINE

## 2023-09-29 PROCEDURE — 84100 ASSAY OF PHOSPHORUS: CPT | Performed by: EMERGENCY MEDICINE

## 2023-09-29 PROCEDURE — 83880 ASSAY OF NATRIURETIC PEPTIDE: CPT | Performed by: EMERGENCY MEDICINE

## 2023-09-29 PROCEDURE — 80053 COMPREHEN METABOLIC PANEL: CPT | Performed by: EMERGENCY MEDICINE

## 2023-09-29 PROCEDURE — 87635 SARS-COV-2 COVID-19 AMP PRB: CPT | Performed by: EMERGENCY MEDICINE

## 2023-09-29 PROCEDURE — 85025 COMPLETE CBC W/AUTO DIFF WBC: CPT | Performed by: EMERGENCY MEDICINE

## 2023-09-29 PROCEDURE — 86803 HEPATITIS C AB TEST: CPT | Performed by: EMERGENCY MEDICINE

## 2023-09-29 PROCEDURE — 82962 GLUCOSE BLOOD TEST: CPT

## 2023-09-29 PROCEDURE — 99285 EMERGENCY DEPT VISIT HI MDM: CPT

## 2023-09-29 PROCEDURE — 83735 ASSAY OF MAGNESIUM: CPT | Performed by: EMERGENCY MEDICINE

## 2023-09-30 PROBLEM — B34.9 VIRAL SYNDROME: Status: ACTIVE | Noted: 2023-09-30

## 2023-09-30 PROBLEM — R55 NEAR SYNCOPE: Status: ACTIVE | Noted: 2023-09-30

## 2023-09-30 PROBLEM — N39.0 UTI (URINARY TRACT INFECTION): Status: ACTIVE | Noted: 2023-09-30

## 2023-09-30 LAB
ADENOVIRUS: NOT DETECTED
ALBUMIN SERPL BCP-MCNC: 3.3 G/DL (ref 3.5–5.2)
ALP SERPL-CCNC: 59 U/L (ref 55–135)
ALT SERPL W/O P-5'-P-CCNC: 21 U/L (ref 10–44)
ANION GAP SERPL CALC-SCNC: 13 MMOL/L (ref 8–16)
ANION GAP SERPL CALC-SCNC: 14 MMOL/L (ref 8–16)
AST SERPL-CCNC: 18 U/L (ref 10–40)
BACTERIA #/AREA URNS HPF: ABNORMAL /HPF
BASOPHILS # BLD AUTO: 0.02 K/UL (ref 0–0.2)
BASOPHILS NFR BLD: 0.3 % (ref 0–1.9)
BILIRUB SERPL-MCNC: 0.7 MG/DL (ref 0.1–1)
BILIRUB UR QL STRIP: NEGATIVE
BNP SERPL-MCNC: 56 PG/ML (ref 0–99)
BORDETELLA PARAPERTUSSIS (IS1001): NOT DETECTED
BORDETELLA PERTUSSIS (PTXP): NOT DETECTED
BUN SERPL-MCNC: 13 MG/DL (ref 8–23)
BUN SERPL-MCNC: 16 MG/DL (ref 8–23)
CALCIUM SERPL-MCNC: 9 MG/DL (ref 8.7–10.5)
CALCIUM SERPL-MCNC: 9.1 MG/DL (ref 8.7–10.5)
CHLAMYDIA PNEUMONIAE: NOT DETECTED
CHLORIDE SERPL-SCNC: 102 MMOL/L (ref 95–110)
CHLORIDE SERPL-SCNC: 97 MMOL/L (ref 95–110)
CLARITY UR: CLEAR
CO2 SERPL-SCNC: 20 MMOL/L (ref 23–29)
CO2 SERPL-SCNC: 21 MMOL/L (ref 23–29)
COLOR UR: YELLOW
CORONAVIRUS 229E, COMMON COLD VIRUS: NOT DETECTED
CORONAVIRUS HKU1, COMMON COLD VIRUS: NOT DETECTED
CORONAVIRUS NL63, COMMON COLD VIRUS: NOT DETECTED
CORONAVIRUS OC43, COMMON COLD VIRUS: NOT DETECTED
CREAT SERPL-MCNC: 0.8 MG/DL (ref 0.5–1.4)
CREAT SERPL-MCNC: 1 MG/DL (ref 0.5–1.4)
CTP QC/QA: YES
CTP QC/QA: YES
DIFFERENTIAL METHOD: ABNORMAL
EOSINOPHIL # BLD AUTO: 0.5 K/UL (ref 0–0.5)
EOSINOPHIL NFR BLD: 8.1 % (ref 0–8)
ERYTHROCYTE [DISTWIDTH] IN BLOOD BY AUTOMATED COUNT: 18.1 % (ref 11.5–14.5)
EST. GFR  (NO RACE VARIABLE): >60 ML/MIN/1.73 M^2
EST. GFR  (NO RACE VARIABLE): >60 ML/MIN/1.73 M^2
ESTIMATED AVG GLUCOSE: 174 MG/DL (ref 68–131)
FLUBV RNA NPH QL NAA+NON-PROBE: NOT DETECTED
GLUCOSE SERPL-MCNC: 131 MG/DL (ref 70–110)
GLUCOSE SERPL-MCNC: 151 MG/DL (ref 70–110)
GLUCOSE UR QL STRIP: NEGATIVE
HBA1C MFR BLD: 7.7 % (ref 4–5.6)
HCT VFR BLD AUTO: 37.8 % (ref 37–48.5)
HCV AB SERPL QL IA: NEGATIVE
HGB BLD-MCNC: 11.9 G/DL (ref 12–16)
HGB UR QL STRIP: NEGATIVE
HIV 1+2 AB+HIV1 P24 AG SERPL QL IA: NEGATIVE
HPIV1 RNA NPH QL NAA+NON-PROBE: NOT DETECTED
HPIV2 RNA NPH QL NAA+NON-PROBE: NOT DETECTED
HPIV3 RNA NPH QL NAA+NON-PROBE: NOT DETECTED
HPIV4 RNA NPH QL NAA+NON-PROBE: NOT DETECTED
HUMAN METAPNEUMOVIRUS: NOT DETECTED
HYALINE CASTS #/AREA URNS LPF: 3 /LPF
IMM GRANULOCYTES # BLD AUTO: 0.03 K/UL (ref 0–0.04)
IMM GRANULOCYTES NFR BLD AUTO: 0.5 % (ref 0–0.5)
INFLUENZA A (SUBTYPES H1,H1-2009,H3): NOT DETECTED
KETONES UR QL STRIP: ABNORMAL
LACTATE SERPL-SCNC: 1.9 MMOL/L (ref 0.5–2.2)
LDH SERPL L TO P-CCNC: 3.5 MMOL/L (ref 0.5–2.2)
LEUKOCYTE ESTERASE UR QL STRIP: ABNORMAL
LYMPHOCYTES # BLD AUTO: 0.6 K/UL (ref 1–4.8)
LYMPHOCYTES NFR BLD: 10.3 % (ref 18–48)
MAGNESIUM SERPL-MCNC: 1.7 MG/DL (ref 1.6–2.6)
MAGNESIUM SERPL-MCNC: 1.7 MG/DL (ref 1.6–2.6)
MCH RBC QN AUTO: 24.4 PG (ref 27–31)
MCHC RBC AUTO-ENTMCNC: 31.5 G/DL (ref 32–36)
MCV RBC AUTO: 78 FL (ref 82–98)
MICROSCOPIC COMMENT: ABNORMAL
MONOCYTES # BLD AUTO: 0.2 K/UL (ref 0.3–1)
MONOCYTES NFR BLD: 3.5 % (ref 4–15)
MYCOPLASMA PNEUMONIAE: NOT DETECTED
NEUTROPHILS # BLD AUTO: 4.7 K/UL (ref 1.8–7.7)
NEUTROPHILS NFR BLD: 77.3 % (ref 38–73)
NITRITE UR QL STRIP: NEGATIVE
NRBC BLD-RTO: 0 /100 WBC
PH UR STRIP: 6 [PH] (ref 5–8)
PHOSPHATE SERPL-MCNC: 2.9 MG/DL (ref 2.7–4.5)
PLATELET # BLD AUTO: 170 K/UL (ref 150–450)
PMV BLD AUTO: 10.1 FL (ref 9.2–12.9)
POC MOLECULAR INFLUENZA A AGN: NEGATIVE
POC MOLECULAR INFLUENZA B AGN: NEGATIVE
POCT GLUCOSE: 146 MG/DL (ref 70–110)
POCT GLUCOSE: 146 MG/DL (ref 70–110)
POCT GLUCOSE: 160 MG/DL (ref 70–110)
POCT GLUCOSE: 208 MG/DL (ref 70–110)
POTASSIUM SERPL-SCNC: 3.5 MMOL/L (ref 3.5–5.1)
POTASSIUM SERPL-SCNC: 3.6 MMOL/L (ref 3.5–5.1)
PROCALCITONIN SERPL IA-MCNC: 0.18 NG/ML
PROT SERPL-MCNC: 6.6 G/DL (ref 6–8.4)
PROT UR QL STRIP: ABNORMAL
RBC # BLD AUTO: 4.88 M/UL (ref 4–5.4)
RBC #/AREA URNS HPF: 0 /HPF (ref 0–4)
RESPIRATORY INFECTION PANEL SOURCE: NORMAL
RSV RNA NPH QL NAA+NON-PROBE: NOT DETECTED
RV+EV RNA NPH QL NAA+NON-PROBE: NOT DETECTED
SAMPLE: ABNORMAL
SARS-COV-2 RDRP RESP QL NAA+PROBE: NEGATIVE
SARS-COV-2 RNA RESP QL NAA+PROBE: NOT DETECTED
SODIUM SERPL-SCNC: 132 MMOL/L (ref 136–145)
SODIUM SERPL-SCNC: 135 MMOL/L (ref 136–145)
SP GR UR STRIP: 1.01 (ref 1–1.03)
SQUAMOUS #/AREA URNS HPF: 11 /HPF
URN SPEC COLLECT METH UR: ABNORMAL
UROBILINOGEN UR STRIP-ACNC: 1 EU/DL
WBC # BLD AUTO: 6.03 K/UL (ref 3.9–12.7)
WBC #/AREA URNS HPF: 11 /HPF (ref 0–5)

## 2023-09-30 PROCEDURE — 96375 TX/PRO/DX INJ NEW DRUG ADDON: CPT

## 2023-09-30 PROCEDURE — G0378 HOSPITAL OBSERVATION PER HR: HCPCS

## 2023-09-30 PROCEDURE — 99223 PR INITIAL HOSPITAL CARE,LEVL III: ICD-10-PCS | Mod: ,,, | Performed by: PHYSICIAN ASSISTANT

## 2023-09-30 PROCEDURE — 25000003 PHARM REV CODE 250: Performed by: PHYSICIAN ASSISTANT

## 2023-09-30 PROCEDURE — 87086 URINE CULTURE/COLONY COUNT: CPT | Performed by: EMERGENCY MEDICINE

## 2023-09-30 PROCEDURE — 63600175 PHARM REV CODE 636 W HCPCS: Performed by: PHYSICIAN ASSISTANT

## 2023-09-30 PROCEDURE — 83036 HEMOGLOBIN GLYCOSYLATED A1C: CPT | Performed by: PHYSICIAN ASSISTANT

## 2023-09-30 PROCEDURE — 94761 N-INVAS EAR/PLS OXIMETRY MLT: CPT

## 2023-09-30 PROCEDURE — 80048 BASIC METABOLIC PNL TOTAL CA: CPT | Performed by: PHYSICIAN ASSISTANT

## 2023-09-30 PROCEDURE — 36415 COLL VENOUS BLD VENIPUNCTURE: CPT | Performed by: PHYSICIAN ASSISTANT

## 2023-09-30 PROCEDURE — 96365 THER/PROPH/DIAG IV INF INIT: CPT

## 2023-09-30 PROCEDURE — 83605 ASSAY OF LACTIC ACID: CPT | Performed by: EMERGENCY MEDICINE

## 2023-09-30 PROCEDURE — 96360 HYDRATION IV INFUSION INIT: CPT | Mod: 59

## 2023-09-30 PROCEDURE — A4216 STERILE WATER/SALINE, 10 ML: HCPCS | Performed by: PHYSICIAN ASSISTANT

## 2023-09-30 PROCEDURE — 93010 ELECTROCARDIOGRAM REPORT: CPT | Mod: ,,, | Performed by: INTERNAL MEDICINE

## 2023-09-30 PROCEDURE — 83735 ASSAY OF MAGNESIUM: CPT | Performed by: PHYSICIAN ASSISTANT

## 2023-09-30 PROCEDURE — 96372 THER/PROPH/DIAG INJ SC/IM: CPT | Performed by: PHYSICIAN ASSISTANT

## 2023-09-30 PROCEDURE — 81000 URINALYSIS NONAUTO W/SCOPE: CPT | Performed by: EMERGENCY MEDICINE

## 2023-09-30 PROCEDURE — 96361 HYDRATE IV INFUSION ADD-ON: CPT

## 2023-09-30 PROCEDURE — 87798 DETECT AGENT NOS DNA AMP: CPT | Performed by: INTERNAL MEDICINE

## 2023-09-30 PROCEDURE — 87040 BLOOD CULTURE FOR BACTERIA: CPT | Mod: 59 | Performed by: EMERGENCY MEDICINE

## 2023-09-30 PROCEDURE — 82962 GLUCOSE BLOOD TEST: CPT

## 2023-09-30 PROCEDURE — 96376 TX/PRO/DX INJ SAME DRUG ADON: CPT

## 2023-09-30 PROCEDURE — 25000003 PHARM REV CODE 250: Performed by: INTERNAL MEDICINE

## 2023-09-30 PROCEDURE — 25000003 PHARM REV CODE 250: Performed by: EMERGENCY MEDICINE

## 2023-09-30 PROCEDURE — 93010 EKG 12-LEAD: ICD-10-PCS | Mod: ,,, | Performed by: INTERNAL MEDICINE

## 2023-09-30 PROCEDURE — 99223 1ST HOSP IP/OBS HIGH 75: CPT | Mod: ,,, | Performed by: PHYSICIAN ASSISTANT

## 2023-09-30 PROCEDURE — 85025 COMPLETE CBC W/AUTO DIFF WBC: CPT | Performed by: PHYSICIAN ASSISTANT

## 2023-09-30 PROCEDURE — 63600175 PHARM REV CODE 636 W HCPCS: Performed by: EMERGENCY MEDICINE

## 2023-09-30 PROCEDURE — 93005 ELECTROCARDIOGRAM TRACING: CPT

## 2023-09-30 RX ORDER — ACETAMINOPHEN 325 MG/1
650 TABLET ORAL EVERY 6 HOURS PRN
Status: DISCONTINUED | OUTPATIENT
Start: 2023-09-30 | End: 2023-10-01 | Stop reason: HOSPADM

## 2023-09-30 RX ORDER — GLUCAGON 1 MG
1 KIT INJECTION
Status: DISCONTINUED | OUTPATIENT
Start: 2023-09-30 | End: 2023-10-01 | Stop reason: HOSPADM

## 2023-09-30 RX ORDER — NALOXONE HCL 0.4 MG/ML
0.02 VIAL (ML) INJECTION
Status: DISCONTINUED | OUTPATIENT
Start: 2023-09-30 | End: 2023-10-01 | Stop reason: HOSPADM

## 2023-09-30 RX ORDER — HEPARIN SODIUM 5000 [USP'U]/ML
5000 INJECTION, SOLUTION INTRAVENOUS; SUBCUTANEOUS EVERY 8 HOURS
Status: DISCONTINUED | OUTPATIENT
Start: 2023-09-30 | End: 2023-10-01 | Stop reason: HOSPADM

## 2023-09-30 RX ORDER — TALC
6 POWDER (GRAM) TOPICAL NIGHTLY PRN
Status: DISCONTINUED | OUTPATIENT
Start: 2023-09-30 | End: 2023-10-01 | Stop reason: HOSPADM

## 2023-09-30 RX ORDER — FAMOTIDINE 10 MG/ML
20 INJECTION INTRAVENOUS 2 TIMES DAILY
Status: DISCONTINUED | OUTPATIENT
Start: 2023-09-30 | End: 2023-10-01 | Stop reason: HOSPADM

## 2023-09-30 RX ORDER — SODIUM CHLORIDE 0.9 % (FLUSH) 0.9 %
10 SYRINGE (ML) INJECTION EVERY 8 HOURS
Status: DISCONTINUED | OUTPATIENT
Start: 2023-09-30 | End: 2023-10-01 | Stop reason: HOSPADM

## 2023-09-30 RX ORDER — SODIUM CHLORIDE 0.9 % (FLUSH) 0.9 %
10 SYRINGE (ML) INJECTION
Status: DISCONTINUED | OUTPATIENT
Start: 2023-09-30 | End: 2023-10-01 | Stop reason: HOSPADM

## 2023-09-30 RX ORDER — POTASSIUM CHLORIDE 20 MEQ/1
40 TABLET, EXTENDED RELEASE ORAL ONCE
Status: COMPLETED | OUTPATIENT
Start: 2023-09-30 | End: 2023-09-30

## 2023-09-30 RX ORDER — TALC
6 POWDER (GRAM) TOPICAL NIGHTLY PRN
Status: DISCONTINUED | OUTPATIENT
Start: 2023-09-30 | End: 2023-09-30 | Stop reason: SDUPTHER

## 2023-09-30 RX ORDER — IBUPROFEN 200 MG
16 TABLET ORAL
Status: DISCONTINUED | OUTPATIENT
Start: 2023-09-30 | End: 2023-10-01 | Stop reason: HOSPADM

## 2023-09-30 RX ORDER — FAMOTIDINE 10 MG/ML
20 INJECTION INTRAVENOUS 2 TIMES DAILY
Status: DISCONTINUED | OUTPATIENT
Start: 2023-09-30 | End: 2023-09-30

## 2023-09-30 RX ORDER — AMOXICILLIN 250 MG
1 CAPSULE ORAL 2 TIMES DAILY PRN
Status: DISCONTINUED | OUTPATIENT
Start: 2023-09-30 | End: 2023-10-01 | Stop reason: HOSPADM

## 2023-09-30 RX ORDER — INSULIN ASPART 100 [IU]/ML
0-5 INJECTION, SOLUTION INTRAVENOUS; SUBCUTANEOUS
Status: DISCONTINUED | OUTPATIENT
Start: 2023-09-30 | End: 2023-10-01 | Stop reason: HOSPADM

## 2023-09-30 RX ORDER — LANOLIN ALCOHOL/MO/W.PET/CERES
400 CREAM (GRAM) TOPICAL ONCE
Status: COMPLETED | OUTPATIENT
Start: 2023-09-30 | End: 2023-09-30

## 2023-09-30 RX ORDER — IBUPROFEN 200 MG
24 TABLET ORAL
Status: DISCONTINUED | OUTPATIENT
Start: 2023-09-30 | End: 2023-10-01 | Stop reason: HOSPADM

## 2023-09-30 RX ORDER — ONDANSETRON 2 MG/ML
4 INJECTION INTRAMUSCULAR; INTRAVENOUS EVERY 8 HOURS PRN
Status: DISCONTINUED | OUTPATIENT
Start: 2023-09-30 | End: 2023-10-01 | Stop reason: HOSPADM

## 2023-09-30 RX ORDER — DIPHENHYDRAMINE HYDROCHLORIDE 50 MG/ML
25 INJECTION INTRAMUSCULAR; INTRAVENOUS EVERY 6 HOURS PRN
Status: DISCONTINUED | OUTPATIENT
Start: 2023-09-30 | End: 2023-10-01 | Stop reason: HOSPADM

## 2023-09-30 RX ADMIN — POTASSIUM CHLORIDE 40 MEQ: 1500 TABLET, EXTENDED RELEASE ORAL at 02:09

## 2023-09-30 RX ADMIN — CEFTRIAXONE SODIUM 1 G: 1 INJECTION, POWDER, FOR SOLUTION INTRAMUSCULAR; INTRAVENOUS at 01:09

## 2023-09-30 RX ADMIN — Medication 400 MG: at 02:09

## 2023-09-30 RX ADMIN — DIPHENHYDRAMINE HYDROCHLORIDE 25 MG: 50 INJECTION, SOLUTION INTRAMUSCULAR; INTRAVENOUS at 05:09

## 2023-09-30 RX ADMIN — HEPARIN SODIUM 5000 UNITS: 5000 INJECTION INTRAVENOUS; SUBCUTANEOUS at 01:09

## 2023-09-30 RX ADMIN — DIPHENHYDRAMINE HYDROCHLORIDE 25 MG: 50 INJECTION, SOLUTION INTRAMUSCULAR; INTRAVENOUS at 01:09

## 2023-09-30 RX ADMIN — SODIUM CHLORIDE, POTASSIUM CHLORIDE, SODIUM LACTATE AND CALCIUM CHLORIDE 500 ML: 600; 310; 30; 20 INJECTION, SOLUTION INTRAVENOUS at 04:09

## 2023-09-30 RX ADMIN — INSULIN ASPART 2 UNITS: 100 INJECTION, SOLUTION INTRAVENOUS; SUBCUTANEOUS at 04:09

## 2023-09-30 RX ADMIN — HEPARIN SODIUM 5000 UNITS: 5000 INJECTION INTRAVENOUS; SUBCUTANEOUS at 05:09

## 2023-09-30 RX ADMIN — Medication 10 ML: at 09:09

## 2023-09-30 RX ADMIN — FAMOTIDINE 20 MG: 10 INJECTION, SOLUTION INTRAVENOUS at 09:09

## 2023-09-30 RX ADMIN — HEPARIN SODIUM 5000 UNITS: 5000 INJECTION INTRAVENOUS; SUBCUTANEOUS at 09:09

## 2023-09-30 RX ADMIN — SODIUM CHLORIDE 2232 ML: 0.9 INJECTION, SOLUTION INTRAVENOUS at 12:09

## 2023-09-30 RX ADMIN — Medication 10 ML: at 01:09

## 2023-09-30 RX ADMIN — THIAMINE HYDROCHLORIDE: 100 INJECTION, SOLUTION INTRAMUSCULAR; INTRAVENOUS at 07:09

## 2023-09-30 RX ADMIN — Medication 10 ML: at 05:09

## 2023-09-30 RX ADMIN — FAMOTIDINE 20 MG: 10 INJECTION, SOLUTION INTRAVENOUS at 05:09

## 2023-09-30 NOTE — ED NOTES
Patient reports chills. Temperature WDL. Warm blanket provided. Family at bedside. IVF infusing, will continue to monitor.

## 2023-09-30 NOTE — HPI
Ms. Garciela Rhodes is a 63 y.o. female, with PMH of T2DM, LV dysfunction s/p AICD placement, cardiomyopathy, prior MI (on Plavix), CHF, HTN, who presented to [facility] ED on 9/29/23 due to one week of worsening fatigue with a near-syncopal episode tonight. Two days ago she took an Epsom salt bath and subsequently broke out in hives from her neck to her knees. This evening she felt light headed, and had a headache so she went to take a shower, and had a near-syncopal episode. EMS was called to her home, en route to the ED she was treated with 50 mg IV Benadryl and 1L IV fluids. She notes associated fevers, chills, myalgias, and loss of appetite. She states Tylenol provided only minimum relief from symptoms when she took it 3 days ago. She denied rhinorrhea, sneezing, chest pain, SOB, N/V/D or swelling. She was evaluated in the ED with labs showing no leukocytosis or anemia. A metabolic panel showed sodium of 132, and CO2 of 21. She had negative BNP, negative procalcitonin, negative Covid and negative HIV testing. A UA showed 1+ protein, trace ketones, 1+ leukocytes, 11 WBC/hpf, and few bacteria. A CXR showed prominent cardiomediastinal silhouette, without evidence for PNA or volume overload. She was treated in the ED with rocephin and sepsis dose fluids. She was placed on observation.

## 2023-09-30 NOTE — ED NOTES
Rash noted to BUE and BLE. Results for orthostatic BP and increase in rash notified to ROSLYN Holder. Safety measures in place. Will continue to monitor.

## 2023-09-30 NOTE — ED NOTES
Report given by ANNA Ramey and ANNA Jauregui. Pt asleep, visible chest rise and fall, resting comfortably in bed.

## 2023-09-30 NOTE — ED NOTES
Patient updated on treatment plan, pt verbalized understanding, warm blanket provided, IV antibiotics infusing, safety measures in place will continue to monitor.

## 2023-09-30 NOTE — ED TRIAGE NOTES
Patient presents to ED via EMS transport. EMS reports administering 1L of NS. Patient C/O headaches, chills, subjective fever,  generalized body aches, rash bilateral upper extremities, benadryl, pre-syncopal episode with an onset of last week. Pt reports taking tylenol & benadryl prior to ED arrival. Hx CHF, HTN. Defibrillator. Patient denies SOB, nausea/vomiting, dysuria, & diarrhea.

## 2023-09-30 NOTE — ASSESSMENT & PLAN NOTE
- Ms. 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

## 2023-09-30 NOTE — H&P
[facility] Emergency [facility] Medicine  History & Physical    Patient Name: Graciela Rhodes  MRN: [id number]  Patient Class: OP- Observation  Admission Date: 9/29/2023  Attending Physician: Sonido Chavarria MD   Primary Care Provider: Jake Hess MD         Patient information was obtained from patient, relative(s), past medical records and ER records.     Subjective:     Principal Problem:Viral syndrome    Chief Complaint:   Chief Complaint   Patient presents with    Fatigue     Pt arrives via EMS from home with c/o worsening fatigue over 1 week. Pt also c/o rash to BUE after taking a bath in Epsom salt 2 days ago. Denies N/V. Pt received 50 MG IV Benadryl in route.        HPI: Ms. Graciela Rhodes is a 63 y.o. female, with PMH of T2DM, LV dysfunction s/p AICD placement, cardiomyopathy, prior MI (on Plavix), CHF, HTN, who presented to [facility] on 9/29/23 due to one week of worsening fatigue with a near-syncopal episode tonight. Two days ago she took an Epsom salt bath and subsequently broke out in hives from her neck to her knees. This evening she felt light headed, and had a headache so she went to take a shower, and had a near-syncopal episode. EMS was called to her home, en route to the ED she was treated with 50 mg IV Benadryl and 1L IV fluids. She notes associated fevers, chills, myalgias, and loss of appetite. She states Tylenol provided only minimum relief from symptoms when she took it 3 days ago. She denied rhinorrhea, sneezing, chest pain, SOB, N/V/D or swelling. She was evaluated in the ED with labs showing no leukocytosis or anemia. A metabolic panel showed sodium of 132, and CO2 of 21. She had negative BNP, negative procalcitonin, negative Covid and negative HIV testing. A UA showed 1+ protein, trace ketones, 1+ leukocytes, 11 WBC/hpf, and few bacteria. A CXR showed prominent cardiomediastinal silhouette, without evidence for PNA or volume overload. She was treated in the ED with  rocephin and sepsis dose fluids. She was placed on observation.       Past Medical History:   Diagnosis Date    Cardiac defibrillator in place 10/06/2009    serial # [id number],  model # [id number]    CHF (congestive heart failure)     Diabetes mellitus     Hypertension     MI (myocardial infarction)        Past Surgical History:   Procedure Laterality Date    CARDIAC CATHETERIZATION      CARDIAC DEFIBRILLATOR PLACEMENT  10/06/2009     SECTION      X's 3    HERNIA REPAIR      umbilical, X's 2, last done in        Review of patient's allergies indicates:   Allergen Reactions    Ciprofloxacin Rash       No current facility-administered medications on file prior to encounter.     Current Outpatient Medications on File Prior to Encounter   Medication Sig    ACCU-CHEK MICHELLE PLUS Strp     ACCU-CHEK MULTICLIX LANCET lancets     aspirin (ECOTRIN) 81 MG EC tablet Take by mouth once daily.    clopidogreL (PLAVIX) 75 mg tablet TAKE 1 TABLET BY MOUTH EVERY DAY    furosemide (LASIX) 40 MG tablet TAKE 1 TABLET(40 MG) BY MOUTH EVERY DAY    JANUVIA 100 mg Tab TAKE 1 TABLET BY MOUTH EVERY DAY    levothyroxine (SYNTHROID) 75 MCG tablet     metFORMIN (GLUCOPHAGE) 1000 MG tablet TAKE 1 TABLET BY MOUTH TWICE DAILY    metoprolol succinate (TOPROL-XL) 50 MG 24 hr tablet TAKE 1 TABLET(50 MG) BY MOUTH EVERY DAY    furosemide (LASIX) 40 MG tablet TAKE 1 TABLET BY MOUTH ONCE DAILY    furosemide (LASIX) 40 MG tablet TAKE 1 TABLET BY MOUTH EVERY DAY    ibuprofen (ADVIL,MOTRIN) 600 MG tablet Take 1 tablet (600 mg total) by mouth every 6 (six) hours as needed for Pain.    JANUVIA 100 mg Tab TAKE 1 TABLET BY MOUTH EVERY DAY    metFORMIN (GLUCOPHAGE) 1000 MG tablet TAKE 1 TABLET BY MOUTH TWICE DAILY    metoprolol tartrate (LOPRESSOR) 25 MG tablet Take 25 mg by mouth 2 (two) times daily.    ramipril (ALTACE) 2.5 MG capsule     rosuvastatin (CRESTOR) 20 MG tablet Take 20 mg by mouth once daily.     Family History     None       Tobacco Use    Smoking status: Former     Current packs/day: 0.00     Types: Cigarettes     Quit date:      Years since quittin.7    Smokeless tobacco: Not on file   Substance and Sexual Activity    Alcohol use: No    Drug use: No    Sexual activity: Not on file     Review of Systems   Constitutional:  Positive for activity change, appetite change, chills, fatigue and fever. Negative for diaphoresis.   HENT:  Negative for congestion, ear pain, postnasal drip, rhinorrhea, sinus pressure, sore throat and trouble swallowing.    Respiratory:  Negative for cough, shortness of breath and wheezing.    Cardiovascular:  Negative for chest pain and palpitations.   Gastrointestinal:  Positive for nausea. Negative for abdominal distention, abdominal pain, constipation, diarrhea and vomiting.   Genitourinary:  Negative for decreased urine volume, dysuria, flank pain, frequency, hematuria and urgency.   Musculoskeletal:  Negative for arthralgias, back pain, myalgias, neck pain and neck stiffness.   Skin:  Positive for rash. Negative for color change, pallor and wound.   Neurological:  Positive for syncope (near). Negative for dizziness, weakness, light-headedness, numbness and headaches.   Psychiatric/Behavioral:  Negative for agitation, behavioral problems, confusion and decreased concentration.      Objective:     Vital Signs (Most Recent):  Temp: 98.7 °F (37.1 °C) (23 0500)  Pulse: 83 (23 0450)  Resp: (!) 29 (23 0450)  BP: (!) 91/54 (23 0450)  SpO2: 96 % (23 0450) Vital Signs (24h Range):  Temp:  [98.2 °F (36.8 °C)-99.9 °F (37.7 °C)] 98.7 °F (37.1 °C)  Pulse:  [68-86] 83  Resp:  [18-29] 29  SpO2:  [96 %-100 %] 96 %  BP: ()/(39-84) 91/54     Weight: 74.4 kg (164 lb)  Body mass index is 30 kg/m².     Physical Exam  Vitals and nursing note reviewed.   Constitutional:       General: She is not in acute distress.     Appearance: She is well-developed. She is obese. She  is not ill-appearing, toxic-appearing or diaphoretic.   HENT:      Head: Normocephalic and atraumatic.   Eyes:      General: No scleral icterus.        Right eye: No discharge.         Left eye: No discharge.      Conjunctiva/sclera: Conjunctivae normal.   Neck:      Trachea: No tracheal deviation.   Cardiovascular:      Rate and Rhythm: Normal rate and regular rhythm.      Heart sounds: Normal heart sounds. No murmur heard.     No gallop.   Pulmonary:      Effort: Pulmonary effort is normal. No respiratory distress.      Breath sounds: Normal breath sounds. No stridor. No wheezing or rales.   Abdominal:      General: Bowel sounds are normal. There is no distension.      Palpations: Abdomen is soft. There is no mass.      Tenderness: There is no abdominal tenderness. There is no guarding.   Musculoskeletal:         General: No deformity. Normal range of motion.      Cervical back: Normal range of motion and neck supple.   Skin:     General: Skin is warm and dry.      Coloration: Skin is not pale.      Findings: Rash (diffuse blanching rash that spares distal extremities including palms and soles. No oral involvement.) present. No erythema.   Neurological:      General: No focal deficit present.      Mental Status: She is alert and oriented to person, place, and time.      Cranial Nerves: No cranial nerve deficit.      Motor: No abnormal muscle tone.   Psychiatric:         Mood and Affect: Mood normal.         Behavior: Behavior normal.         Thought Content: Thought content normal.         Judgment: Judgment normal.                Significant Labs: All pertinent labs within the past 24 hours have been reviewed.  BMP:   Recent Labs   Lab 09/29/23  2345   *   *   K 3.6   CL 97   CO2 21*   BUN 16   CREATININE 1.0   CALCIUM 9.1   MG 1.7     CBC:   Recent Labs   Lab 09/29/23  2345   WBC 9.73   HGB 12.1   HCT 38.8        CMP:   Recent Labs   Lab 09/29/23  2345   *   K 3.6   CL 97   CO2 21*  "  *   BUN 16   CREATININE 1.0   CALCIUM 9.1   PROT 6.6   ALBUMIN 3.3*   BILITOT 0.7   ALKPHOS 59   AST 18   ALT 21   ANIONGAP 14     Urine Culture: No results for input(s): "LABURIN" in the last 48 hours.  Urine Studies:   Recent Labs   Lab 09/30/23  0036   COLORU Yellow   APPEARANCEUA Clear   PHUR 6.0   SPECGRAV 1.015   PROTEINUA 1+*   GLUCUA Negative   KETONESU Trace*   BILIRUBINUA Negative   OCCULTUA Negative   NITRITE Negative   UROBILINOGEN 1.0   LEUKOCYTESUR 1+*   RBCUA 0   WBCUA 11*   BACTERIA Few*   SQUAMEPITHEL 11   HYALINECASTS 3*       Significant Imaging: I have reviewed all pertinent imaging results/findings within the past 24 hours.  Imaging Results              X-Ray Chest AP Portable (Final result)  Result time 09/30/23 00:59:15      Final result by Nicki Garay MD (09/30/23 00:59:15)                   Impression:      Please see above.      Electronically signed by: Nicki Garay MD  Date:    09/30/2023  Time:    00:59               Narrative:    EXAMINATION:  XR CHEST AP PORTABLE    CLINICAL HISTORY:  Sepsis;    TECHNIQUE:  Single frontal view of the chest was performed.    COMPARISON:  04/09/2013    FINDINGS:  Cardiac monitoring leads overlie the chest.  There is a stably positioned left-sided cardiac pacing device in place.  The cardiomediastinal silhouette is prominent in size.  Mediastinal structures are midline.  The lungs are symmetrically expanded without evidence of confluent airspace consolidation.  Increased attenuation of the lower lung zones, most notably the left likely relates to overlying soft tissue and prominent cardiomediastinal silhouette.  Allowing for this, no large volume of pleural fluid or pneumothorax identified.  Osseous structures are intact.                                       Assessment/Plan:     * Viral syndrome  - Ms. 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, respiratory viral panel pending   - IV fluids and banana bag for hydration  - pepcid and benadryl to help reduce rash symptoms   - Zinc   - supportive care       UTI (urinary tract infection)  - continue rocephin as started in the ED   - urine culture pending       Near syncope  - orthostatic vital signs  - Echo   - monitor on tele   - hydrate     LV dysfunction  - history noted   - 11/2015 Echo w/ EF 32%       AICD (automatic cardioverter/defibrillator) present  - Presence noted       Type 2 diabetes mellitus, without long-term current use of insulin  - Patient's FSGs are controlled on current medication regimen.  Last A1c reviewed-   Lab Results   Component Value Date    HGBA1C 7.0 (H) 08/10/2009     Most recent fingerstick glucose reviewed-   Recent Labs   Lab 09/29/23  2308   POCTGLUCOSE 152     Current correctional scale  Low  Maintain anti-hyperglycemic dose as follows-   Antihyperglycemics (From admission, onward)    Start     Stop Route Frequency Ordered    09/30/23 0734  insulin aspart U-100 pen 0-5 Units         -- SubQ Before meals & nightly PRN 09/30/23 0634        Hold Oral hypoglycemics while patient is in the hospital.        VTE Risk Mitigation (From admission, onward)         Ordered     heparin (porcine) injection 5,000 Units  Every 8 hours         09/30/23 0244     IP VTE HIGH RISK PATIENT  Once         09/30/23 0436     Place sequential compression device  Until discontinued         09/30/23 0244                     On 09/30/2023, patient should be placed in hospital observation services under the care of Dr. Sonido Chavarria MD.      LORNE BrownC  Department of Hospital Medicine  [facility] - Emergency Dept

## 2023-09-30 NOTE — PLAN OF CARE
Lives with  & sons - independent in ADLs -  will provide transportation home     [facility] ([address])  Initial Discharge Assessment       Primary Care Provider: Sp Connelly III, MD    Admission Diagnosis: Viral syndrome [B34.9]  Urticaria [L50.9]  Near syncope [R55]  Elevated lactic acid level [R79.89]    Admission Date: 9/29/2023  Expected Discharge Date:     Transition of Care Barriers: None    Payor: [employer] / Plan: [employer] [place] SELECT PLUS TIERED / Product Type: Commercial /     Extended Emergency Contact Information  Primary Emergency Contact: Bassem Rhodes  Address: [address] [place] [address] [place]  Mobile Phone: [phone]  Relation: Spouse    Discharge Plan A: Home with family         [id number] - [place], [place] - [address] AT [address]  [address]  [facility] [address]  Phone: [phone] Fax: [phone]      Initial Assessment (most recent)       Adult Discharge Assessment - 09/30/23 1521          Discharge Assessment    Assessment Type Discharge Planning Assessment     Confirmed/corrected address, phone number and insurance Yes     Confirmed Demographics --   corrected all phone #s    Source of Information patient     Does patient/caregiver understand observation status Yes     People in Home spouse;child(morelia), adult     Do you expect to return to your current living situation? Yes     Do you have help at home or someone to help you manage your care at home? Yes     Prior to hospitilization cognitive status: Alert/Oriented     Current cognitive status: Alert/Oriented     Equipment Currently Used at Home none     Readmission within 30 days? No     Patient currently being followed by outpatient case management? No     Do you currently have service(s) that help you manage your care at home? No     Do you take prescription medications? Yes     Do you have  prescription coverage? Yes     Do you have any problems affording any of your prescribed medications? No     Is the patient taking medications as prescribed? yes     Who is going to help you get home at discharge?      How do you get to doctors appointments? family or friend will provide     Are you on dialysis? No     DME Needed Upon Discharge  none     Discharge Plan discussed with: Patient     Transition of Care Barriers None     Discharge Plan A Home with family        Physical Activity    On average, how many days per week do you engage in moderate to strenuous exercise (like a brisk walk)? 0 days     On average, how many minutes do you engage in exercise at this level? 0 min        Financial Resource Strain    How hard is it for you to pay for the very basics like food, housing, medical care, and heating? Somewhat hard        Housing Stability    In the last 12 months, was there a time when you were not able to pay the mortgage or rent on time? No     In the last 12 months, how many places have you lived? 1     In the last 12 months, was there a time when you did not have a steady place to sleep or slept in a shelter (including now)? No        Transportation Needs    In the past 12 months, has lack of transportation kept you from medical appointments or from getting medications? No     In the past 12 months, has lack of transportation kept you from meetings, work, or from getting things needed for daily living? No        Food Insecurity    Within the past 12 months, you worried that your food would run out before you got the money to buy more. Never true     Within the past 12 months, the food you bought just didn't last and you didn't have money to get more. Never true        Stress    Do you feel stress - tense, restless, nervous, or anxious, or unable to sleep at night because your mind is troubled all the time - these days? To some extent        Social Connections    In a typical week, how many  times do you talk on the phone with family, friends, or neighbors? Never     How often do you get together with friends or relatives? Never     How often do you attend Lutheran or [employer] services? More than 4 times per year     Do you belong to any clubs or organizations such as Lutheran groups, unions, fraternal or athletic groups, or school groups? No     How often do you attend meetings of the clubs or organizations you belong to? Never     Are you , , , , never , or living with a partner?         Alcohol Use    Q1: How often do you have a drink containing alcohol? Never     Q2: How many drinks containing alcohol do you have on a typical day when you are drinking? Patient does not drink     Q3: How often do you have six or more drinks on one occasion? Never

## 2023-09-30 NOTE — SUBJECTIVE & OBJECTIVE
Past Medical History:   Diagnosis Date    Cardiac defibrillator in place 10/06/2009    serial # [id number],  model # [id number]    CHF (congestive heart failure)     Diabetes mellitus     Hypertension     MI (myocardial infarction)        Past Surgical History:   Procedure Laterality Date    CARDIAC CATHETERIZATION      CARDIAC DEFIBRILLATOR PLACEMENT  10/06/2009     SECTION      X's 3    HERNIA REPAIR      umbilical, X's 2, last done in        Review of patient's allergies indicates:   Allergen Reactions    Ciprofloxacin Rash       No current facility-administered medications on file prior to encounter.     Current Outpatient Medications on File Prior to Encounter   Medication Sig    ACCU-CHEK MICHELLE PLUS Strp     ACCU-CHEK MULTICLIX LANCET lancets     aspirin (ECOTRIN) 81 MG EC tablet Take by mouth once daily.    clopidogreL (PLAVIX) 75 mg tablet TAKE 1 TABLET BY MOUTH EVERY DAY    furosemide (LASIX) 40 MG tablet TAKE 1 TABLET(40 MG) BY MOUTH EVERY DAY    JANUVIA 100 mg Tab TAKE 1 TABLET BY MOUTH EVERY DAY    levothyroxine (SYNTHROID) 75 MCG tablet     metFORMIN (GLUCOPHAGE) 1000 MG tablet TAKE 1 TABLET BY MOUTH TWICE DAILY    metoprolol succinate (TOPROL-XL) 50 MG 24 hr tablet TAKE 1 TABLET(50 MG) BY MOUTH EVERY DAY    furosemide (LASIX) 40 MG tablet TAKE 1 TABLET BY MOUTH ONCE DAILY    furosemide (LASIX) 40 MG tablet TAKE 1 TABLET BY MOUTH EVERY DAY    ibuprofen (ADVIL,MOTRIN) 600 MG tablet Take 1 tablet (600 mg total) by mouth every 6 (six) hours as needed for Pain.    JANUVIA 100 mg Tab TAKE 1 TABLET BY MOUTH EVERY DAY    metFORMIN (GLUCOPHAGE) 1000 MG tablet TAKE 1 TABLET BY MOUTH TWICE DAILY    metoprolol tartrate (LOPRESSOR) 25 MG tablet Take 25 mg by mouth 2 (two) times daily.    ramipril (ALTACE) 2.5 MG capsule     rosuvastatin (CRESTOR) 20 MG tablet Take 20 mg by mouth once daily.     Family History    None       Tobacco Use    Smoking status: Former     Current packs/day: 0.00     Types:  Cigarettes     Quit date:      Years since quittin.7    Smokeless tobacco: Not on file   Substance and Sexual Activity    Alcohol use: No    Drug use: No    Sexual activity: Not on file     Review of Systems   Constitutional:  Positive for activity change, appetite change, chills, fatigue and fever. Negative for diaphoresis.   HENT:  Negative for congestion, ear pain, postnasal drip, rhinorrhea, sinus pressure, sore throat and trouble swallowing.    Respiratory:  Negative for cough, shortness of breath and wheezing.    Cardiovascular:  Negative for chest pain and palpitations.   Gastrointestinal:  Positive for nausea. Negative for abdominal distention, abdominal pain, constipation, diarrhea and vomiting.   Genitourinary:  Negative for decreased urine volume, dysuria, flank pain, frequency, hematuria and urgency.   Musculoskeletal:  Negative for arthralgias, back pain, myalgias, neck pain and neck stiffness.   Skin:  Positive for rash. Negative for color change, pallor and wound.   Neurological:  Positive for syncope (near). Negative for dizziness, weakness, light-headedness, numbness and headaches.   Psychiatric/Behavioral:  Negative for agitation, behavioral problems, confusion and decreased concentration.      Objective:     Vital Signs (Most Recent):  Temp: 98.7 °F (37.1 °C) (23 0500)  Pulse: 83 (23 0450)  Resp: (!) 29 (23 0450)  BP: (!) 91/54 (23 0450)  SpO2: 96 % (23 0450) Vital Signs (24h Range):  Temp:  [98.2 °F (36.8 °C)-99.9 °F (37.7 °C)] 98.7 °F (37.1 °C)  Pulse:  [68-86] 83  Resp:  [18-29] 29  SpO2:  [96 %-100 %] 96 %  BP: ()/(39-84) 91/54     Weight: 74.4 kg (164 lb)  Body mass index is 30 kg/m².     Physical Exam  Vitals and nursing note reviewed.   Constitutional:       General: She is not in acute distress.     Appearance: She is well-developed. She is obese. She is not ill-appearing, toxic-appearing or diaphoretic.   HENT:      Head: Normocephalic and  atraumatic.   Eyes:      General: No scleral icterus.        Right eye: No discharge.         Left eye: No discharge.      Conjunctiva/sclera: Conjunctivae normal.   Neck:      Trachea: No tracheal deviation.   Cardiovascular:      Rate and Rhythm: Normal rate and regular rhythm.      Heart sounds: Normal heart sounds. No murmur heard.     No gallop.   Pulmonary:      Effort: Pulmonary effort is normal. No respiratory distress.      Breath sounds: Normal breath sounds. No stridor. No wheezing or rales.   Abdominal:      General: Bowel sounds are normal. There is no distension.      Palpations: Abdomen is soft. There is no mass.      Tenderness: There is no abdominal tenderness. There is no guarding.   Musculoskeletal:         General: No deformity. Normal range of motion.      Cervical back: Normal range of motion and neck supple.   Skin:     General: Skin is warm and dry.      Coloration: Skin is not pale.      Findings: Rash (diffuse blanching rash that spares distal extremities including palms and soles. No oral involvement.) present. No erythema.   Neurological:      General: No focal deficit present.      Mental Status: She is alert and oriented to person, place, and time.      Cranial Nerves: No cranial nerve deficit.      Motor: No abnormal muscle tone.   Psychiatric:         Mood and Affect: Mood normal.         Behavior: Behavior normal.         Thought Content: Thought content normal.         Judgment: Judgment normal.                Significant Labs: All pertinent labs within the past 24 hours have been reviewed.  BMP:   Recent Labs   Lab 09/29/23  2345   *   *   K 3.6   CL 97   CO2 21*   BUN 16   CREATININE 1.0   CALCIUM 9.1   MG 1.7     CBC:   Recent Labs   Lab 09/29/23  2345   WBC 9.73   HGB 12.1   HCT 38.8        CMP:   Recent Labs   Lab 09/29/23  2345   *   K 3.6   CL 97   CO2 21*   *   BUN 16   CREATININE 1.0   CALCIUM 9.1   PROT 6.6   ALBUMIN 3.3*   BILITOT 0.7  "  ALKPHOS 59   AST 18   ALT 21   ANIONGAP 14     Urine Culture: No results for input(s): "LABURIN" in the last 48 hours.  Urine Studies:   Recent Labs   Lab 09/30/23  0036   COLORU Yellow   APPEARANCEUA Clear   PHUR 6.0   SPECGRAV 1.015   PROTEINUA 1+*   GLUCUA Negative   KETONESU Trace*   BILIRUBINUA Negative   OCCULTUA Negative   NITRITE Negative   UROBILINOGEN 1.0   LEUKOCYTESUR 1+*   RBCUA 0   WBCUA 11*   BACTERIA Few*   SQUAMEPITHEL 11   HYALINECASTS 3*       Significant Imaging: I have reviewed all pertinent imaging results/findings within the past 24 hours.  Imaging Results              X-Ray Chest AP Portable (Final result)  Result time 09/30/23 00:59:15      Final result by Nicki Garay MD (09/30/23 00:59:15)                   Impression:      Please see above.      Electronically signed by: Nicki Garay MD  Date:    09/30/2023  Time:    00:59               Narrative:    EXAMINATION:  XR CHEST AP PORTABLE    CLINICAL HISTORY:  Sepsis;    TECHNIQUE:  Single frontal view of the chest was performed.    COMPARISON:  04/09/2013    FINDINGS:  Cardiac monitoring leads overlie the chest.  There is a stably positioned left-sided cardiac pacing device in place.  The cardiomediastinal silhouette is prominent in size.  Mediastinal structures are midline.  The lungs are symmetrically expanded without evidence of confluent airspace consolidation.  Increased attenuation of the lower lung zones, most notably the left likely relates to overlying soft tissue and prominent cardiomediastinal silhouette.  Allowing for this, no large volume of pleural fluid or pneumothorax identified.  Osseous structures are intact.                                     "

## 2023-09-30 NOTE — ASSESSMENT & PLAN NOTE
- Patient's FSGs are controlled on current medication regimen.  Last A1c reviewed-   Lab Results   Component Value Date    HGBA1C 7.0 (H) 08/10/2009     Most recent fingerstick glucose reviewed-   Recent Labs   Lab 09/29/23  2308   POCTGLUCOSE 152     Current correctional scale  Low  Maintain anti-hyperglycemic dose as follows-   Antihyperglycemics (From admission, onward)    Start     Stop Route Frequency Ordered    09/30/23 0734  insulin aspart U-100 pen 0-5 Units         -- SubQ Before meals & nightly PRN 09/30/23 0634        Hold Oral hypoglycemics while patient is in the hospital.

## 2023-09-30 NOTE — ED PROVIDER NOTES
Encounter Date: 9/29/2023    SCRIBE #1 NOTE: I, Ronda Hodgson, am scribing for, and in the presence of,  Trudi Kennedy MD. I have scribed the following portions of the note - the EKG reading. Other sections scribed: HPI, ROS.       History     Chief Complaint   Patient presents with    Fatigue     Pt arrives via EMS from home with c/o worsening fatigue over 1 week. Pt also c/o rash to BUE after taking a bath in Epsom salt 2 days ago. Denies N/V. Pt received 50 MG IV Benadryl in route.     63 y.o. female with a PMHx of HTN, IDDM, MI (on Plavix), and CHF presents to the ED via EMS c/o near-syncopal episode. She reports to have been having subjective fevers and chills for the past week with associated fatigue and myalgias. She took tylenol for pain relief with minimal improvement 3 days ago. She reports taking an Epsom salt bath 3 days ago and noticed a rash to her neck down to her knees shortly after. No rash noted to the genital area or mouth. After taking a shower earlier this evening she felt lightheaded with an associated pounding headache. She states she went into the kitchen where her family member called EMS. On route, she was given 50 mg IV benadryl and 1 liter of fluids. No recent rhinorrhea, sneezing, N/V/D. She does report a recent loss of appetite but states her sugars have been running at a normal level. No chest pain, SOB, or swelling. No further complaints.     The history is provided by the patient and medical records. No  was used.     Review of patient's allergies indicates:   Allergen Reactions    Ciprofloxacin Rash     Past Medical History:   Diagnosis Date    Cardiac defibrillator in place 10/06/2009    serial # [id number],  model # [id number]    CHF (congestive heart failure)     Diabetes mellitus     Hypertension     MI (myocardial infarction)      Past Surgical History:   Procedure Laterality Date    CARDIAC CATHETERIZATION      CARDIAC DEFIBRILLATOR PLACEMENT  10/06/2009      SECTION      X's 3    HERNIA REPAIR      umbilical, X's 2, last done in      History reviewed. No pertinent family history.  Social History     Tobacco Use    Smoking status: Former     Current packs/day: 0.00     Types: Cigarettes     Quit date:      Years since quittin.7   Substance Use Topics    Alcohol use: No    Drug use: No     Review of Systems   Constitutional:  Positive for chills (subjective), fatigue and fever (subjective).   HENT:  Negative for congestion and sore throat.    Eyes:  Negative for visual disturbance.   Respiratory:  Negative for cough and shortness of breath.    Cardiovascular:  Negative for chest pain and palpitations.   Gastrointestinal:  Negative for abdominal pain, diarrhea and vomiting.   Genitourinary:  Negative for decreased urine volume, dysuria and vaginal discharge.   Musculoskeletal:  Positive for myalgias. Negative for joint swelling, neck pain and neck stiffness.   Skin:  Positive for rash. Negative for wound.   Neurological:  Positive for light-headedness and headaches. Negative for weakness and numbness.   Psychiatric/Behavioral:  Negative for confusion.        Physical Exam     Initial Vitals   BP Pulse Resp Temp SpO2   23 2242 23 2242 23 2242 23 2250 23 224   138/84 86 18 99.9 °F (37.7 °C) 99 %      MAP       --                Physical Exam    Nursing note and vitals reviewed.  Constitutional: She appears well-developed and well-nourished. She appears distressed.   Ill appearing.    HENT:   Head: Normocephalic and atraumatic.   Mouth/Throat: Oropharynx is clear and moist. No oropharyngeal exudate.   Eyes: Conjunctivae and EOM are normal. Pupils are equal, round, and reactive to light.   Neck: Neck supple.   Cardiovascular:  Normal rate and normal heart sounds.           No murmur heard.  Pulmonary/Chest: Breath sounds normal. No respiratory distress. She has no wheezes. She has no rhonchi. She has no rales.   No crackles.    Abdominal: Abdomen is soft. There is no abdominal tenderness. There is no rebound and no guarding.   Musculoskeletal:         General: No tenderness or edema.      Cervical back: Neck supple.     Neurological: She is alert and oriented to person, place, and time. She has normal strength. GCS score is 15. GCS eye subscore is 4. GCS verbal subscore is 5. GCS motor subscore is 6.   Skin: Skin is warm and dry.   Erythematous rash from neck to knee. (See image below). No involvement of palms or soles.   Psychiatric: She has a normal mood and affect. Thought content normal.               ED Course   Procedures  Labs Reviewed   CBC W/ AUTO DIFFERENTIAL - Abnormal; Notable for the following components:       Result Value    MCV 78 (*)     MCH 24.4 (*)     MCHC 31.2 (*)     RDW 17.7 (*)     Immature Granulocytes 0.6 (*)     Immature Grans (Abs) 0.06 (*)     Lymph # 0.9 (*)     Eos # 0.6 (*)     Gran % 79.3 (*)     Lymph % 9.0 (*)     All other components within normal limits    Narrative:     Release to patient->Immediate   COMPREHENSIVE METABOLIC PANEL - Abnormal; Notable for the following components:    Sodium 132 (*)     CO2 21 (*)     Glucose 151 (*)     Albumin 3.3 (*)     All other components within normal limits    Narrative:     Release to patient->Immediate   URINALYSIS, REFLEX TO URINE CULTURE - Abnormal; Notable for the following components:    Protein, UA 1+ (*)     Ketones, UA Trace (*)     Leukocytes, UA 1+ (*)     All other components within normal limits    Narrative:     Specimen Source->Urine   URINALYSIS MICROSCOPIC - Abnormal; Notable for the following components:    WBC, UA 11 (*)     Bacteria Few (*)     Hyaline Casts, UA 3 (*)     All other components within normal limits    Narrative:     Specimen Source->Urine   POCT GLUCOSE - Abnormal; Notable for the following components:    POCT Glucose 152 (*)     All other components within normal limits   ISTAT LACTATE - Abnormal; Notable for the following  components:    POC Lactate 3.48 (*)     All other components within normal limits   ISTAT LACTATE - Abnormal; Notable for the following components:    POC Lactate 3.50 (*)     All other components within normal limits   RESPIRATORY INFECTION PANEL (PCR), NASOPHARYNGEAL    Narrative:     For all other respiratory sources, order [id number] -  Respiratory Viral Panel by PCR   CULTURE, BLOOD   CULTURE, BLOOD   CULTURE, URINE   MAGNESIUM    Narrative:     Release to patient->Immediate   PHOSPHORUS    Narrative:     Release to patient->Immediate   B-TYPE NATRIURETIC PEPTIDE    Narrative:     Release to patient->Immediate   PROCALCITONIN    Narrative:     Release to patient->Immediate   HIV 1 / 2 ANTIBODY    Narrative:     Release to patient->Immediate   HEPATITIS C ANTIBODY    Narrative:     Release to patient->Immediate   LACTIC ACID, PLASMA   BASIC METABOLIC PANEL   MAGNESIUM   CBC W/ AUTO DIFFERENTIAL   SARS-COV-2 RDRP GENE   POCT INFLUENZA A/B MOLECULAR     EKG Readings: (Independently Interpreted)   Initial Reading: No STEMI. Previous EKG: Compared with most recent EKG Rhythm: Normal Sinus Rhythm. Heart Rate: 67. Ectopy: No Ectopy. Other Impression: Right bundle branch block; similar morphology to previous.       Imaging Results              X-Ray Chest AP Portable (Final result)  Result time 09/30/23 00:59:15      Final result by Nicki Garay MD (09/30/23 00:59:15)                   Impression:      Please see above.      Electronically signed by: Nicki Garay MD  Date:    09/30/2023  Time:    00:59               Narrative:    EXAMINATION:  XR CHEST AP PORTABLE    CLINICAL HISTORY:  Sepsis;    TECHNIQUE:  Single frontal view of the chest was performed.    COMPARISON:  04/09/2013    FINDINGS:  Cardiac monitoring leads overlie the chest.  There is a stably positioned left-sided cardiac pacing device in place.  The cardiomediastinal silhouette is prominent in size.  Mediastinal structures are midline.  The lungs are  symmetrically expanded without evidence of confluent airspace consolidation.  Increased attenuation of the lower lung zones, most notably the left likely relates to overlying soft tissue and prominent cardiomediastinal silhouette.  Allowing for this, no large volume of pleural fluid or pneumothorax identified.  Osseous structures are intact.                                       Medications   sodium chloride 0.9% flush 10 mL (has no administration in time range)   melatonin tablet 6 mg (has no administration in time range)   ondansetron injection 4 mg (has no administration in time range)   cefTRIAXone (ROCEPHIN) 1 g in dextrose 5 % in water (D5W) 100 mL IVPB (MB+) (has no administration in time range)   sodium chloride 0.9% flush 10 mL (10 mLs Intravenous Given 9/30/23 0525)   senna-docusate 8.6-50 mg per tablet 1 tablet (has no administration in time range)   acetaminophen tablet 650 mg (has no administration in time range)   naloxone 0.4 mg/mL injection 0.02 mg (has no administration in time range)   heparin (porcine) injection 5,000 Units (5,000 Units Subcutaneous Given 9/30/23 0513)   diphenhydrAMINE injection 25 mg (25 mg Intravenous Given 9/30/23 0513)   famotidine (PF) injection 20 mg (20 mg Intravenous Given 9/30/23 0559)   sodium chloride 0.9% 1,000 mL with mvi, (ADULT) no.4 with vit K 3,300 unit- 150 mcg/10 mL 10 mL, thiamine 100 mg, folic acid 1 mg infusion (has no administration in time range)   sodium chloride 0.9% bolus 2,232 mL 2,232 mL (0 mLs Intravenous Stopped 9/30/23 0146)   cefTRIAXone (ROCEPHIN) 1 g in dextrose 5 % in water (D5W) 100 mL IVPB (MB+) (0 g Intravenous Stopped 9/30/23 0228)   lactated ringers bolus 500 mL (0 mLs Intravenous Stopped 9/30/23 0555)     Medical Decision Making  Emergent evaluation of 63-year-old female who presents with complaint of a near syncopal episode, recent illness over the last week.  Vital signs reveal hypotension, afebrile.  Patient has history of CHF and has  already received 1 L IV fluids by paramedics.  Additional fluid is given for 30 cc/kilogram fluid bolus per sepsis recommendations.  I was suspicious for viral etiology, but COVID and rapid flu testing are negative.  Chest x-ray showed no pneumonia.  Urinalysis does show 11 WBCs per high-powered field but she has no urinary symptoms and I doubt UTI as cause of hypotension.  This was covered with urine culture and IV Rocephin.  I ultimately suspect that this presentation represents a post COVID syndrome - her symptoms have been ongoing for 1 week and she could be testing now negative.  Rash is urticarial versus viral exanthem, I do not suspect Carmona-Jose syndrome or toxic epidermal necrolysis.  Lactic acid is elevated at 3.5, she is admitted for hydration and further care.    Amount and/or Complexity of Data Reviewed  Labs: ordered.  Radiology: ordered.    Risk  OTC drugs.  Prescription drug management.                               Clinical Impression:   Final diagnoses:  [R55] Near syncope (Primary)  [R79.89] Elevated lactic acid level  [B34.9] Viral syndrome  [L50.9] Urticaria        ED Disposition Condition    Observation Stable                Trudi Kennedy MD  09/30/23 0267

## 2023-10-01 VITALS
TEMPERATURE: 99 F | RESPIRATION RATE: 16 BRPM | BODY MASS INDEX: 31.18 KG/M2 | OXYGEN SATURATION: 95 % | HEIGHT: 62 IN | DIASTOLIC BLOOD PRESSURE: 56 MMHG | WEIGHT: 169.44 LBS | SYSTOLIC BLOOD PRESSURE: 109 MMHG | HEART RATE: 96 BPM

## 2023-10-01 LAB
ANION GAP SERPL CALC-SCNC: 14 MMOL/L (ref 8–16)
ANISOCYTOSIS BLD QL SMEAR: SLIGHT
BACTERIA UR CULT: NORMAL
BACTERIA UR CULT: NORMAL
BASOPHILS # BLD AUTO: 0.04 K/UL (ref 0–0.2)
BASOPHILS NFR BLD: 0.4 % (ref 0–1.9)
BUN SERPL-MCNC: 9 MG/DL (ref 8–23)
CALCIUM SERPL-MCNC: 8.8 MG/DL (ref 8.7–10.5)
CHLORIDE SERPL-SCNC: 106 MMOL/L (ref 95–110)
CO2 SERPL-SCNC: 15 MMOL/L (ref 23–29)
CREAT SERPL-MCNC: 0.8 MG/DL (ref 0.5–1.4)
DIFFERENTIAL METHOD: ABNORMAL
EOSINOPHIL # BLD AUTO: 0.6 K/UL (ref 0–0.5)
EOSINOPHIL NFR BLD: 6.9 % (ref 0–8)
ERYTHROCYTE [DISTWIDTH] IN BLOOD BY AUTOMATED COUNT: 18.5 % (ref 11.5–14.5)
EST. GFR  (NO RACE VARIABLE): >60 ML/MIN/1.73 M^2
GLUCOSE SERPL-MCNC: 153 MG/DL (ref 70–110)
HCT VFR BLD AUTO: 40.2 % (ref 37–48.5)
HGB BLD-MCNC: 12.3 G/DL (ref 12–16)
IMM GRANULOCYTES # BLD AUTO: 0.07 K/UL (ref 0–0.04)
IMM GRANULOCYTES NFR BLD AUTO: 0.8 % (ref 0–0.5)
LYMPHOCYTES # BLD AUTO: 1.2 K/UL (ref 1–4.8)
LYMPHOCYTES NFR BLD: 12.8 % (ref 18–48)
MAGNESIUM SERPL-MCNC: 1.8 MG/DL (ref 1.6–2.6)
MCH RBC QN AUTO: 24.5 PG (ref 27–31)
MCHC RBC AUTO-ENTMCNC: 30.6 G/DL (ref 32–36)
MCV RBC AUTO: 80 FL (ref 82–98)
MONOCYTES # BLD AUTO: 0.5 K/UL (ref 0.3–1)
MONOCYTES NFR BLD: 5 % (ref 4–15)
NEUTROPHILS # BLD AUTO: 6.9 K/UL (ref 1.8–7.7)
NEUTROPHILS NFR BLD: 74.1 % (ref 38–73)
NRBC BLD-RTO: 0 /100 WBC
OVALOCYTES BLD QL SMEAR: ABNORMAL
PLATELET # BLD AUTO: 183 K/UL (ref 150–450)
PLATELET BLD QL SMEAR: ABNORMAL
PMV BLD AUTO: 10.1 FL (ref 9.2–12.9)
POCT GLUCOSE: 162 MG/DL (ref 70–110)
POIKILOCYTOSIS BLD QL SMEAR: SLIGHT
POTASSIUM SERPL-SCNC: 4.1 MMOL/L (ref 3.5–5.1)
RBC # BLD AUTO: 5.02 M/UL (ref 4–5.4)
SODIUM SERPL-SCNC: 135 MMOL/L (ref 136–145)
WBC # BLD AUTO: 9.31 K/UL (ref 3.9–12.7)

## 2023-10-01 PROCEDURE — 99239 HOSP IP/OBS DSCHRG MGMT >30: CPT | Mod: ,,, | Performed by: INTERNAL MEDICINE

## 2023-10-01 PROCEDURE — 80048 BASIC METABOLIC PNL TOTAL CA: CPT | Performed by: PHYSICIAN ASSISTANT

## 2023-10-01 PROCEDURE — 85025 COMPLETE CBC W/AUTO DIFF WBC: CPT | Performed by: PHYSICIAN ASSISTANT

## 2023-10-01 PROCEDURE — G0378 HOSPITAL OBSERVATION PER HR: HCPCS

## 2023-10-01 PROCEDURE — 96376 TX/PRO/DX INJ SAME DRUG ADON: CPT

## 2023-10-01 PROCEDURE — 36415 COLL VENOUS BLD VENIPUNCTURE: CPT | Performed by: PHYSICIAN ASSISTANT

## 2023-10-01 PROCEDURE — 25000003 PHARM REV CODE 250: Performed by: PHYSICIAN ASSISTANT

## 2023-10-01 PROCEDURE — 83735 ASSAY OF MAGNESIUM: CPT | Performed by: PHYSICIAN ASSISTANT

## 2023-10-01 PROCEDURE — 99239 PR HOSPITAL DISCHARGE DAY,>30 MIN: ICD-10-PCS | Mod: ,,, | Performed by: INTERNAL MEDICINE

## 2023-10-01 RX ORDER — LEVOTHYROXINE SODIUM 88 UG/1
88 TABLET ORAL
Start: 2023-10-01

## 2023-10-01 RX ADMIN — FAMOTIDINE 20 MG: 10 INJECTION, SOLUTION INTRAVENOUS at 08:10

## 2023-10-01 NOTE — DISCHARGE INSTRUCTIONS
I suspect your fever, fatigue, rash (exanthem) are the result of a viral infection.  I expect your symptoms to continue to improve.  No antibiotics are needed.  Continue to rest and increase your oral fluids over the next 24 hours.  For the rash, use Benadryl 25mg every 6 hours as needed for the itching.  Moisturize twice a day and consider oatmeal baths as needed.  I have placed a referral for Dermatology in case the rash does not improve, or worsens.      Your Blood Pressure has been low since your admission.  Please monitor your blood pressure closely at home with a goal blood pressure of less than 120/80, but higher than 100/60.  I recommend holding your Metoprolol and Lasix another day and resume 10/2/2023 or 10/3/2023 once your blood pressure stabilizes.  Follow up with your Primary Care Provider soon.

## 2023-10-01 NOTE — PLAN OF CARE
Met with patient at bedside to discuss discharge - ambulatory referral placed to Dermatology - patient aware to expect call from  - will follow up with PCP as needed -  will provide transportation home     [facility] ([address])  Discharge Final Note    Primary Care Provider: Sp Connelly III, MD    Expected Discharge Date: 10/1/2023    Final Discharge Note (most recent)       Final Note - 10/01/23 1129          Final Note    Assessment Type Final Discharge Note     Anticipated Discharge Disposition Home or Self Care     What phone number can be called within the next 1-3 days to see how you are doing after discharge? [phone]     Hospital Resources/Appts/Education Provided Provided patient/caregiver with written discharge plan information        Post-Acute Status    Discharge Delays None known at this time                   Contact Info       Delores Spear MD   Specialty: Dermatology    [facility] [address] [address]   Phone: [phone]       Next Steps: Schedule an appointment as soon as possible for a visit

## 2023-10-01 NOTE — PROGRESS NOTES
AVS reviewed with pt. Pt verbalizes understanding. PIV discontinued. No medications to be delivered to bedside. Pt to discharge to home with . Work excuse given to pt.

## 2023-10-01 NOTE — DISCHARGE SUMMARY
[facility] ([address] Medicine  Discharge Summary      Patient Name: Graciela Rhodes  MRN: [id number]  CROW: [id number]  Patient Class: OP- Observation  Admission Date: 9/29/2023  Hospital Length of Stay: 0 days  Discharge Date and Time:  10/01/2023 10:55 AM  Attending Physician: Sonido Chavarria MD   Discharging Provider: Sonido Chavarria MD  Primary Care Provider: Sp Connelly III, MD    Primary Care Team: Networked reference to record PCT     HPI:   Ms. Graciela Rhodes is a 63 y.o. female, with PMH of T2DM, LV dysfunction s/p AICD placement, cardiomyopathy, prior MI (on Plavix), CHF, HTN, who presented to [facility] ED on 9/29/23 due to one week of worsening fatigue with a near-syncopal episode tonight. Two days ago she took an Epsom salt bath and subsequently broke out in hives from her neck to her knees. This evening she felt light headed, and had a headache so she went to take a shower, and had a near-syncopal episode. EMS was called to her home, en route to the ED she was treated with 50 mg IV Benadryl and 1L IV fluids. She notes associated fevers, chills, myalgias, and loss of appetite. She states Tylenol provided only minimum relief from symptoms when she took it 3 days ago. She denied rhinorrhea, sneezing, chest pain, SOB, N/V/D or swelling. She was evaluated in the ED with labs showing no leukocytosis or anemia. A metabolic panel showed sodium of 132, and CO2 of 21. She had negative BNP, negative procalcitonin, negative Covid and negative HIV testing. A UA showed 1+ protein, trace ketones, 1+ leukocytes, 11 WBC/hpf, and few bacteria. A CXR showed prominent cardiomediastinal silhouette, without evidence for PNA or volume overload. She was treated in the ED with rocephin and sepsis dose fluids. She was placed on observation.       * No surgery found *      Hospital Course:   Patient presented with complaints of pre-syncope and rash.  She started with headache, neck pain and  myalgias and then developed a fever with chills which began about a week ago.  No cough or SOB.  No change in vision.  No N/V/D.  No abdominal pain or dysuria.  No recent travel.  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  She took a bath with epsom salts 2-3 days ago and noticed a diffuse rash the following day. Has mild pruritis.  She denies new laundry detergent and lotions.  Upon taking a shower the day of admission, she felt light-headed and family called EMS.  She was given Benadryl by EMS and transported to ED.  She was told she had a fever of 102 in ambulance, but afebrile in ED.  CXR on admission was unremarkable.  Labs with negative Flu/COVID, WBC normal with mild left shift, Procal 0.18, LFTs normal, UA with nitrite/leukocytes negative, BNP 56.  Lactate was elevated at 3.48 and improved to normal.  She was treated with a Banana Bag, LR bolus of 500ml, and Ceftriaxone.  Suspect she has a vital syndrome with exanthem.  Antibiotics discontinued on 9/30/2023. Respiratory Infection Panel ordered and negative.  Benadryl given as needed.  She feels much better today and afebrile.  Rash less erythematous today on bilateral Upper and Lower Extremities.  Stable on chest and back.  Mild pruritis.  SBP still a little low.  Will discharge home today.  Increase oral fluids at home for the next 24 hours.  Continue to hold Lasix and Metoprolol given low SBP - resume when improves.  Monitor BP at home and parameters gvien.  Dermatology referral if rash does not continue to improve.  Letter provided for her job.         Goals of Care Treatment Preferences:  Code Status: Full Code        Final Active Diagnoses:    Diagnosis Date Noted POA    PRINCIPAL PROBLEM:  Viral syndrome [B34.9] 09/30/2023 Yes    UTI (urinary tract infection) [N39.0] 09/30/2023 Yes    Near syncope [R55] 09/30/2023 Yes    Type 2 diabetes mellitus, without long-term  current use of insulin [E11.9] 11/28/2016 Yes    LV dysfunction [I51.9] 11/28/2016 Yes    AICD (automatic cardioverter/defibrillator) present [Z95.810] 04/09/2013 Yes      Problems Resolved During this Admission:       Discharged Condition: fair    Disposition: Home or Self Care    Follow Up:   Follow-up Information     Delores Spear MD. Schedule an appointment as soon as possible for a visit.    Specialty: Dermatology  Contact information:  [address] [place] [id number]  [phone]                       Patient Instructions:      Ambulatory referral/consult to Dermatology   Standing Status: Future   Referral Priority: Routine Referral Type: Consultation   Referral Reason: Specialty Services Required   Referred to Provider: DELORES SPEAR Requested Specialty: Dermatology     Diet Cardiac     Diet diabetic     Notify your health care provider if you experience any of the following:  temperature >100.4     Notify your health care provider if you experience any of the following:  persistent nausea and vomiting or diarrhea     Notify your health care provider if you experience any of the following:  worsening rash     Activity as tolerated       Significant Diagnostic Studies: Labs:   BMP:   Recent Labs   Lab 09/29/23  2345 09/30/23  0733 10/01/23  0440   * 131* 153*   * 135* 135*   K 3.6 3.5 4.1   CL 97 102 106   CO2 21* 20* 15*   BUN 16 13 9   CREATININE 1.0 0.8 0.8   CALCIUM 9.1 9.0 8.8   MG 1.7 1.7 1.8    and CBC   Recent Labs   Lab 09/29/23  2345 09/30/23  0733 10/01/23  0440   WBC 9.73 6.03 9.31   HGB 12.1 11.9* 12.3   HCT 38.8 37.8 40.2    170 183       Pending Diagnostic Studies:     None         Medications:  Reconciled Home Medications:      Medication List      CHANGE how you take these medications    furosemide 40 MG tablet  Commonly known as: LASIX  TAKE 1 TABLET(40 MG) BY MOUTH EVERY DAY  What changed: Another medication with the same name was removed. Continue  taking this medication, and follow the directions you see here.     JANUVIA 100 MG Tab  Generic drug: SITagliptin phosphate  TAKE 1 TABLET BY MOUTH EVERY DAY  What changed: Another medication with the same name was removed. Continue taking this medication, and follow the directions you see here.     levothyroxine 88 MCG tablet  Commonly known as: SYNTHROID  Take 1 tablet (88 mcg total) by mouth before breakfast.  What changed:   · medication strength  · how much to take  · how to take this  · when to take this     metFORMIN 1000 MG tablet  Commonly known as: GLUCOPHAGE  TAKE 1 TABLET BY MOUTH TWICE DAILY  What changed: Another medication with the same name was removed. Continue taking this medication, and follow the directions you see here.        CONTINUE taking these medications    ACCU-CHEK MICHELLE PLUS TEST STRP Strp  Generic drug: blood sugar diagnostic     ACCU-CHEK MULTICLIX LANCET Misc  Generic drug: lancets     aspirin 81 MG EC tablet  Commonly known as: ECOTRIN  Take by mouth once daily.     clopidogreL 75 mg tablet  Commonly known as: PLAVIX  TAKE 1 TABLET BY MOUTH EVERY DAY     metoprolol succinate 50 MG 24 hr tablet  Commonly known as: TOPROL-XL  TAKE 1 TABLET(50 MG) BY MOUTH EVERY DAY     rosuvastatin 20 MG tablet  Commonly known as: CRESTOR  Take 20 mg by mouth once daily.        STOP taking these medications    ibuprofen 600 MG tablet  Commonly known as: ADVIL,MOTRIN     metoprolol tartrate 25 MG tablet  Commonly known as: LOPRESSOR     ramipriL 2.5 MG capsule  Commonly known as: ALTACE            Indwelling Lines/Drains at time of discharge:   Lines/Drains/Airways     None                 Time spent on the discharge of patient: 35 minutes         Sonido Chavarria MD  Department of Hospital Medicine  [facility] ([address])

## 2023-10-01 NOTE — HOSPITAL COURSE
Patient presented with complaints of pre-syncope and rash.  She started with headache, neck pain and myalgias and then developed a fever with chills which began about a week ago.  No cough or SOB.  No change in vision.  No N/V/D.  No abdominal pain or dysuria.  No recent travel.  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  She took a bath with epsom salts 2-3 days ago and noticed a diffuse rash the following day. Has mild pruritis.  She denies new laundry detergent and lotions.  Upon taking a shower the day of admission, she felt light-headed and family called EMS.  She was given Benadryl by EMS and transported to ED.  She was told she had a fever of 102 in ambulance, but afebrile in ED.  CXR on admission was unremarkable.  Labs with negative Flu/COVID, WBC normal with mild left shift, Procal 0.18, LFTs normal, UA with nitrite/leukocytes negative, BNP 56.  Lactate was elevated at 3.48 and improved to normal.  She was treated with a Banana Bag, LR bolus of 500ml, and Ceftriaxone.  Suspect she has a vital syndrome with exanthem.  Antibiotics discontinued on 9/30/2023. Respiratory Infection Panel ordered and negative.  Benadryl given as needed.  She feels much better today and afebrile.  Rash less erythematous today on bilateral Upper and Lower Extremities.  Stable on chest and back.  Mild pruritis.  SBP still a little low.  Will discharge home today.  Increase oral fluids at home for the next 24 hours.  Continue to hold Lasix and Metoprolol given low SBP - resume when improves.  Monitor BP at home and parameters gvien.  Dermatology referral if rash does not continue to improve.  Letter provided for her job.

## 2023-10-05 LAB
BACTERIA BLD CULT: NORMAL
BACTERIA BLD CULT: NORMAL

## 2024-01-01 PROBLEM — N39.0 UTI (URINARY TRACT INFECTION): Status: RESOLVED | Noted: 2023-09-30 | Resolved: 2024-01-01

## 2024-05-31 ENCOUNTER — HOSPITAL ENCOUNTER (EMERGENCY)
Facility: OTHER | Age: 64
Discharge: HOME | End: 2024-05-31
Attending: EMERGENCY MEDICINE
Payer: COMMERCIAL

## 2024-05-31 VITALS
DIASTOLIC BLOOD PRESSURE: 57 MMHG | HEIGHT: 62 IN | OXYGEN SATURATION: 96 % | HEART RATE: 77 BPM | TEMPERATURE: 98 F | WEIGHT: 170 LBS | SYSTOLIC BLOOD PRESSURE: 118 MMHG | BODY MASS INDEX: 31.28 KG/M2 | RESPIRATION RATE: 16 BRPM

## 2024-05-31 DIAGNOSIS — R06.02 SHORTNESS OF BREATH: Primary | ICD-10-CM

## 2024-05-31 LAB
ALBUMIN SERPL BCP-MCNC: 3.7 G/DL (ref 3.5–5.2)
ALP SERPL-CCNC: 62 U/L (ref 55–135)
ALT SERPL W/O P-5'-P-CCNC: 14 U/L (ref 10–44)
ANION GAP SERPL CALC-SCNC: 13 MMOL/L (ref 8–16)
AST SERPL-CCNC: 16 U/L (ref 10–40)
BASOPHILS # BLD AUTO: 0.05 K/UL (ref 0–0.2)
BASOPHILS NFR BLD: 0.8 % (ref 0–1.9)
BILIRUB SERPL-MCNC: 0.5 MG/DL (ref 0.1–1)
BUN SERPL-MCNC: 13 MG/DL (ref 8–23)
CALCIUM SERPL-MCNC: 9.8 MG/DL (ref 8.7–10.5)
CHLORIDE SERPL-SCNC: 105 MMOL/L (ref 95–110)
CO2 SERPL-SCNC: 19 MMOL/L (ref 23–29)
CREAT SERPL-MCNC: 0.8 MG/DL (ref 0.5–1.4)
CTP QC/QA: YES
CTP QC/QA: YES
DIFFERENTIAL METHOD BLD: ABNORMAL
EOSINOPHIL # BLD AUTO: 0.2 K/UL (ref 0–0.5)
EOSINOPHIL NFR BLD: 3 % (ref 0–8)
ERYTHROCYTE [DISTWIDTH] IN BLOOD BY AUTOMATED COUNT: 20 % (ref 11.5–14.5)
EST. GFR  (NO RACE VARIABLE): >60 ML/MIN/1.73 M^2
GLUCOSE SERPL-MCNC: 153 MG/DL (ref 70–110)
HCT VFR BLD AUTO: 38.8 % (ref 37–48.5)
HGB BLD-MCNC: 11.5 G/DL (ref 12–16)
IMM GRANULOCYTES # BLD AUTO: 0.04 K/UL (ref 0–0.04)
IMM GRANULOCYTES NFR BLD AUTO: 0.6 % (ref 0–0.5)
LYMPHOCYTES # BLD AUTO: 0.8 K/UL (ref 1–4.8)
LYMPHOCYTES NFR BLD: 11.5 % (ref 18–48)
MCH RBC QN AUTO: 22.8 PG (ref 27–31)
MCHC RBC AUTO-ENTMCNC: 29.6 G/DL (ref 32–36)
MCV RBC AUTO: 77 FL (ref 82–98)
MONOCYTES # BLD AUTO: 0.9 K/UL (ref 0.3–1)
MONOCYTES NFR BLD: 13.5 % (ref 4–15)
NEUTROPHILS # BLD AUTO: 4.7 K/UL (ref 1.8–7.7)
NEUTROPHILS NFR BLD: 70.6 % (ref 38–73)
NRBC BLD-RTO: 0 /100 WBC
PLATELET # BLD AUTO: 266 K/UL (ref 150–450)
PLATELET BLD QL SMEAR: ABNORMAL
PMV BLD AUTO: 9.5 FL (ref 9.2–12.9)
POC MOLECULAR INFLUENZA A AGN: NEGATIVE
POC MOLECULAR INFLUENZA B AGN: NEGATIVE
POTASSIUM SERPL-SCNC: 3.8 MMOL/L (ref 3.5–5.1)
PROT SERPL-MCNC: 7.5 G/DL (ref 6–8.4)
RBC # BLD AUTO: 5.04 M/UL (ref 4–5.4)
SARS-COV-2 RDRP RESP QL NAA+PROBE: NEGATIVE
SODIUM SERPL-SCNC: 137 MMOL/L (ref 136–145)
TROPONIN I SERPL DL<=0.01 NG/ML-MCNC: <0.006 NG/ML (ref 0–0.03)
WBC # BLD AUTO: 6.61 K/UL (ref 3.9–12.7)

## 2024-05-31 PROCEDURE — 93010 ELECTROCARDIOGRAM REPORT: CPT | Mod: ,,, | Performed by: INTERNAL MEDICINE

## 2024-05-31 PROCEDURE — 99285 EMERGENCY DEPT VISIT HI MDM: CPT | Mod: 25

## 2024-05-31 PROCEDURE — 25000242 PHARM REV CODE 250 ALT 637 W/ HCPCS: Performed by: EMERGENCY MEDICINE

## 2024-05-31 PROCEDURE — 87635 SARS-COV-2 COVID-19 AMP PRB: CPT | Performed by: PHYSICIAN ASSISTANT

## 2024-05-31 PROCEDURE — 84484 ASSAY OF TROPONIN QUANT: CPT | Performed by: EMERGENCY MEDICINE

## 2024-05-31 PROCEDURE — 80053 COMPREHEN METABOLIC PANEL: CPT | Performed by: EMERGENCY MEDICINE

## 2024-05-31 PROCEDURE — 87205 SMEAR GRAM STAIN: CPT | Performed by: EMERGENCY MEDICINE

## 2024-05-31 PROCEDURE — 25000003 PHARM REV CODE 250: Performed by: EMERGENCY MEDICINE

## 2024-05-31 PROCEDURE — 94761 N-INVAS EAR/PLS OXIMETRY MLT: CPT

## 2024-05-31 PROCEDURE — 87070 CULTURE OTHR SPECIMN AEROBIC: CPT | Performed by: EMERGENCY MEDICINE

## 2024-05-31 PROCEDURE — 93005 ELECTROCARDIOGRAM TRACING: CPT

## 2024-05-31 PROCEDURE — 85025 COMPLETE CBC W/AUTO DIFF WBC: CPT | Performed by: EMERGENCY MEDICINE

## 2024-05-31 PROCEDURE — 94640 AIRWAY INHALATION TREATMENT: CPT

## 2024-05-31 RX ORDER — IPRATROPIUM BROMIDE AND ALBUTEROL SULFATE 2.5; .5 MG/3ML; MG/3ML
3 SOLUTION RESPIRATORY (INHALATION) ONCE
Status: COMPLETED | OUTPATIENT
Start: 2024-05-31 | End: 2024-05-31

## 2024-05-31 RX ORDER — ALBUTEROL SULFATE 90 UG/1
1-2 AEROSOL, METERED RESPIRATORY (INHALATION) EVERY 6 HOURS PRN
Qty: 18 G | Refills: 0 | Status: SHIPPED | OUTPATIENT
Start: 2024-05-31

## 2024-05-31 RX ORDER — IBUPROFEN 600 MG/1
600 TABLET ORAL
Status: COMPLETED | OUTPATIENT
Start: 2024-05-31 | End: 2024-05-31

## 2024-05-31 RX ORDER — BENZONATATE 200 MG/1
200 CAPSULE ORAL 3 TIMES DAILY PRN
Qty: 20 CAPSULE | Refills: 0 | Status: SHIPPED | OUTPATIENT
Start: 2024-05-31 | End: 2024-06-10

## 2024-05-31 RX ADMIN — IBUPROFEN 600 MG: 600 TABLET, FILM COATED ORAL at 12:05

## 2024-05-31 RX ADMIN — IPRATROPIUM BROMIDE AND ALBUTEROL SULFATE 3 ML: 2.5; .5 SOLUTION RESPIRATORY (INHALATION) at 10:05

## 2024-05-31 NOTE — DISCHARGE INSTRUCTIONS
We have prescribed you medication for your cough and breathing. Please fill and take as directed.    Please return to the ER if you have chest pain, difficulty breathing, fevers, altered mental status, dizziness, weakness, or any other concerns.      Follow up with your primary care physician.

## 2024-05-31 NOTE — ED TRIAGE NOTES
Pt presents to ED today c/o congestion, ear discomfort, cough, feeling SOB when laying down   Onset sx x 2 weeks   Reports taking OTC medications and eye drops with no relief   Confirms taking HTN medications denies headache or blurry vision   NAD noted   Additional RN assessment required

## 2024-05-31 NOTE — ED PROVIDER NOTES
Encounter Date: 2024       History     Chief Complaint   Patient presents with    Shortness of Breath     Reports chest congestion, productive cough, eye drainage, and sob with increased wheezing. Hx of CHF. 98% RA.      Seen by physician at 9:35AM:    Patient is a 64-year-old female who presents to the emergency department with shortness breath, chest congestion, and cough.  Patient started having symptoms about 3 weeks ago, but improved the following week.  However this past week, she has had worsening symptoms again.  She has had some subjective chills but no fever.  She denies any chest pain.  She has had generalized weakness and fatigue with decreased appetite but denies any nausea/vomiting/diarrhea.  She was coughing up white sputum and has heard herself wheezing.  She does have a history of smoking but quit many years ago.  She has not ever needed an inhaler in the past.        Review of patient's allergies indicates:   Allergen Reactions    Ciprofloxacin Rash     Past Medical History:   Diagnosis Date    Cardiac defibrillator in place 10/06/2009    serial # [id number],  model # [id number]    CHF (congestive heart failure)     Diabetes mellitus     Hypertension     MI (myocardial infarction)      Past Surgical History:   Procedure Laterality Date    CARDIAC CATHETERIZATION      CARDIAC DEFIBRILLATOR PLACEMENT  10/06/2009     SECTION      X's 3    HERNIA REPAIR      umbilical, X's 2, last done in      No family history on file.  Social History     Tobacco Use    Smoking status: Former     Current packs/day: 0.00     Types: Cigarettes     Quit date:      Years since quitting: 15.4   Substance Use Topics    Alcohol use: No    Drug use: No     Review of Systems   Constitutional:  Positive for activity change, appetite change and chills. Negative for fever.   HENT:  Positive for congestion. Negative for rhinorrhea.    Respiratory:  Positive for cough, shortness of breath and wheezing. Negative  for chest tightness.    Cardiovascular:  Negative for chest pain and palpitations.   Gastrointestinal:  Negative for abdominal pain, diarrhea, nausea and vomiting.   Genitourinary:  Negative for dysuria and flank pain.   Musculoskeletal:  Negative for back pain and neck pain.   Skin:  Negative for color change and wound.   Neurological:  Negative for dizziness and headaches.       Physical Exam     Initial Vitals [05/31/24 0851]   BP Pulse Resp Temp SpO2   (!) 179/79 94 (!) 22 98 °F (36.7 °C) 98 %      MAP       --         Physical Exam    Nursing note and vitals reviewed.  Constitutional: She appears well-developed and well-nourished.   HENT:   Head: Normocephalic and atraumatic.   Eyes: Conjunctivae are normal.   Neck: Neck supple.   Normal range of motion.  Cardiovascular:  Normal rate, regular rhythm and normal heart sounds.           2+ DP/PT pulses bilaterally   Pulmonary/Chest: No respiratory distress. She has no wheezes. She has no rales.   Decreased breath sounds at the bases   Abdominal: Abdomen is soft. Bowel sounds are normal. She exhibits no distension. There is no abdominal tenderness. There is no rebound.   Musculoskeletal:         General: Normal range of motion.      Cervical back: Normal range of motion and neck supple.     Neurological: She is alert and oriented to person, place, and time.   Ambulatory with steady gait.   Skin: Skin is warm and dry. Capillary refill takes less than 2 seconds.         ED Course   Procedures  Labs Reviewed   COMPREHENSIVE METABOLIC PANEL - Abnormal; Notable for the following components:       Result Value    CO2 19 (*)     Glucose 153 (*)     All other components within normal limits   CBC W/ AUTO DIFFERENTIAL - Abnormal; Notable for the following components:    Hemoglobin 11.5 (*)     MCV 77 (*)     MCH 22.8 (*)     MCHC 29.6 (*)     RDW 20.0 (*)     Immature Granulocytes 0.6 (*)     Lymph # 0.8 (*)     Lymph % 11.5 (*)     All other components within normal limits    CULTURE, RESPIRATORY   TROPONIN I   SARS-COV-2 RDRP GENE   POCT INFLUENZA A/B MOLECULAR     EKG Readings: (Independently Interpreted)   9:10AM:  Rate of 85.  Normal sinus rhythm.  Right bundle branch block.  Anterior and lateral Q-waves.  No ST or ischemic changes.       Imaging Results              X-Ray Chest 1 View (Final result)  Result time 05/31/24 09:36:33      Final result by Daisy Awad MD (05/31/24 09:36:33)                   Impression:      Stable chest with no acute cardiopulmonary process seen.      Electronically signed by: Daisy Awad  Date:    05/31/2024  Time:    09:36               Narrative:    EXAMINATION:  XR CHEST 1 VIEW    CLINICAL HISTORY:  shortness of breath;    TECHNIQUE:  Single frontal view of the chest was performed.    COMPARISON:  09/30/2023    FINDINGS:  Lines and tubes:  Left chest wall pacing device with leads in stable position.    Lung volumes are stable.  No acute consolidation, pleural effusion, or pneumothorax.  Cardiac silhouette is mildly enlarged, stable.                                    X-Rays:   Independently Interpreted Readings:   Chest X-Ray: Trachea midline.  No cardiomegaly.  No effusion, infiltrate, edema.     Medications   albuterol-ipratropium 2.5 mg-0.5 mg/3 mL nebulizer solution 3 mL (3 mLs Nebulization Given 5/31/24 1005)   ibuprofen tablet 600 mg (600 mg Oral Given 5/31/24 1216)     Medical Decision Making  9:35AM:  Patient is a 64-year-old female who presents to the emergency department with chest congestion, shortness of breath and coughing.  Patient appears well, nontoxic.  She is breathing comfortably with no respiratory distress but she does have decreased breath sounds in the bases.  Will plan for a breathing treatment given her history of wheezing along with labs, chest x-ray, will continue to follow and reassess.    Amount and/or Complexity of Data Reviewed  Labs: ordered. Decision-making details documented in ED Course.  Radiology:  ordered and independent interpretation performed. Decision-making details documented in ED Course.  ECG/medicine tests: ordered and independent interpretation performed. Decision-making details documented in ED Course.    Risk  Prescription drug management.    12:28 PM:  Patient did well, feeling much better after the neb treatment.  She feels that her breathing has improved.  Her chest x-ray is negative for any acute findings and her labs are otherwise unremarkable.  Patient likely has a viral illness.  Will plan to prescribe an inhaler and Tessalon Perles for her symptoms.  I do not feel that further work up in the ED is indicated at this time.  I updated pt regarding results and I counseled pt regarding supportive care measures.  I have discussed with the pt ED return warnings and need for close PCP f/u.  Pt agreeable to plan and all questions answered.  I feel that pt is stable for discharge and management as an outpatient and no further intervention is needed at this time.  Pt is comfortable returning to the ED if needed.  Will DC home in stable condition.                                    Clinical Impression:  Final diagnoses:  [R06.02] Shortness of breath (Primary)          ED Disposition Condition    Discharge Stable          ED Prescriptions       Medication Sig Dispense Start Date End Date Auth. Provider    albuterol (PROVENTIL/VENTOLIN HFA) 90 mcg/actuation inhaler Inhale 1-2 puffs into the lungs every 6 (six) hours as needed for Wheezing. Rescue 18 g 5/31/2024 -- Ursula Rodríguez MD    benzonatate (TESSALON) 200 MG capsule Take 1 capsule (200 mg total) by mouth 3 (three) times daily as needed for Cough. 20 capsule 5/31/2024 6/10/2024 Ursula Rodríguez MD          Follow-up Information       Follow up With Specialties Details Why Contact Info    Sp Connelly III, MD Internal Medicine   [id number]  [facility] [address]  [facility] [address]  [phone]               Ursula Rodríguez MD  05/31/24  [id number]

## 2024-05-31 NOTE — ED NOTES
Patient educated on how to collect respiratory sputum culture. Pt verbalized understanding. Will check back for sample.

## 2024-05-31 NOTE — FIRST PROVIDER EVALUATION
Emergency Department TeleTriage Encounter Note      CHIEF COMPLAINT    Chief Complaint   Patient presents with    Shortness of Breath     Reports chest congestion, productive cough, eye drainage, and sob with increased wheezing. Hx of CHF. 98% RA.        VITAL SIGNS   Initial Vitals [05/31/24 0851]   BP Pulse Resp Temp SpO2   (!) 179/79 94 (!) 22 98 °F (36.7 °C) 98 %      MAP       --            ALLERGIES    Review of patient's allergies indicates:   Allergen Reactions    Ciprofloxacin Rash       PROVIDER TRIAGE NOTE  Patient presents with complaint of cough and congestion. Also reports shortness of breath. Reports no CP. She reports no LE swelling.      Phy:   Constitutional: well nourished, well developed, appearing stated age, NAD        Initial orders will be placed and care will be transferred to an alternate provider when patient is roomed for a full evaluation. Any additional orders and the final disposition will be determined by that provider.        ORDERS  Labs Reviewed - No data to display    ED Orders (720h ago, onward)      None              Virtual Visit Note: The provider triage portion of this emergency department evaluation and documentation was performed via [employer], a HIPAA-compliant telemedicine application, in concert with a tele-presenter in the room. A face to face patient evaluation with one of my colleagues will occur once the patient is placed in an emergency department room.      DISCLAIMER: This note was prepared with [employer] voice recognition transcription software. Garbled syntax, mangled pronouns, and other bizarre constructions may be attributed to that software system.

## 2024-06-03 LAB
BACTERIA SPEC AEROBE CULT: NORMAL
BACTERIA SPEC AEROBE CULT: NORMAL
GRAM STN SPEC: NORMAL
OHS QRS DURATION: 162 MS
OHS QTC CALCULATION: 506 MS

## 2024-12-12 ENCOUNTER — HOSPITAL ENCOUNTER (OUTPATIENT)
Facility: OTHER | Age: 64
Discharge: HOME | End: 2024-12-14
Attending: EMERGENCY MEDICINE | Admitting: INTERNAL MEDICINE
Payer: COMMERCIAL

## 2024-12-12 DIAGNOSIS — R79.89 ELEVATED TROPONIN: Primary | ICD-10-CM

## 2024-12-12 DIAGNOSIS — I42.9 CARDIOMYOPATHY: ICD-10-CM

## 2024-12-12 DIAGNOSIS — Z45.02 AICD DISCHARGE: ICD-10-CM

## 2024-12-12 PROBLEM — B34.9 VIRAL SYNDROME: Status: RESOLVED | Noted: 2023-09-30 | Resolved: 2024-12-12

## 2024-12-12 PROBLEM — R55 NEAR SYNCOPE: Status: RESOLVED | Noted: 2023-09-30 | Resolved: 2024-12-12

## 2024-12-12 LAB
ALBUMIN SERPL BCP-MCNC: 4.2 G/DL (ref 3.5–5.2)
ALP SERPL-CCNC: 48 U/L (ref 40–150)
ALT SERPL W/O P-5'-P-CCNC: 24 U/L (ref 10–44)
ANION GAP SERPL CALC-SCNC: 12 MMOL/L (ref 8–16)
AST SERPL-CCNC: 20 U/L (ref 10–40)
BASOPHILS # BLD AUTO: 0.06 K/UL (ref 0–0.2)
BASOPHILS NFR BLD: 1 % (ref 0–1.9)
BILIRUB SERPL-MCNC: 0.6 MG/DL (ref 0.1–1)
BUN SERPL-MCNC: 17 MG/DL (ref 8–23)
CALCIUM SERPL-MCNC: 10.2 MG/DL (ref 8.7–10.5)
CHLORIDE SERPL-SCNC: 108 MMOL/L (ref 95–110)
CO2 SERPL-SCNC: 20 MMOL/L (ref 23–29)
CREAT SERPL-MCNC: 0.9 MG/DL (ref 0.5–1.4)
DIFFERENTIAL METHOD BLD: ABNORMAL
EOSINOPHIL # BLD AUTO: 0.3 K/UL (ref 0–0.5)
EOSINOPHIL NFR BLD: 5.2 % (ref 0–8)
ERYTHROCYTE [DISTWIDTH] IN BLOOD BY AUTOMATED COUNT: 18.9 % (ref 11.5–14.5)
EST. GFR  (NO RACE VARIABLE): >60 ML/MIN/1.73 M^2
GLUCOSE SERPL-MCNC: 166 MG/DL (ref 70–110)
HCT VFR BLD AUTO: 37.6 % (ref 37–48.5)
HGB BLD-MCNC: 11.1 G/DL (ref 12–16)
IMM GRANULOCYTES # BLD AUTO: 0.02 K/UL (ref 0–0.04)
IMM GRANULOCYTES NFR BLD AUTO: 0.3 % (ref 0–0.5)
LYMPHOCYTES # BLD AUTO: 0.7 K/UL (ref 1–4.8)
LYMPHOCYTES NFR BLD: 12.2 % (ref 18–48)
MAGNESIUM SERPL-MCNC: 1.9 MG/DL (ref 1.6–2.6)
MCH RBC QN AUTO: 23.2 PG (ref 27–31)
MCHC RBC AUTO-ENTMCNC: 29.5 G/DL (ref 32–36)
MCV RBC AUTO: 79 FL (ref 82–98)
MONOCYTES # BLD AUTO: 0.5 K/UL (ref 0.3–1)
MONOCYTES NFR BLD: 8.5 % (ref 4–15)
NEUTROPHILS # BLD AUTO: 4.4 K/UL (ref 1.8–7.7)
NEUTROPHILS NFR BLD: 72.8 % (ref 38–73)
NRBC BLD-RTO: 0 /100 WBC
PLATELET # BLD AUTO: 230 K/UL (ref 150–450)
PMV BLD AUTO: 9.6 FL (ref 9.2–12.9)
POCT GLUCOSE: 148 MG/DL (ref 70–110)
POTASSIUM SERPL-SCNC: 4 MMOL/L (ref 3.5–5.1)
PROT SERPL-MCNC: 7 G/DL (ref 6–8.4)
RBC # BLD AUTO: 4.79 M/UL (ref 4–5.4)
SODIUM SERPL-SCNC: 140 MMOL/L (ref 136–145)
TROPONIN I SERPL DL<=0.01 NG/ML-MCNC: 0.02 NG/ML (ref 0–0.03)
TROPONIN I SERPL DL<=0.01 NG/ML-MCNC: 0.14 NG/ML (ref 0–0.03)
TSH SERPL DL<=0.005 MIU/L-ACNC: 2.46 UIU/ML (ref 0.4–4)
WBC # BLD AUTO: 5.98 K/UL (ref 3.9–12.7)

## 2024-12-12 PROCEDURE — 82962 GLUCOSE BLOOD TEST: CPT

## 2024-12-12 PROCEDURE — 84443 ASSAY THYROID STIM HORMONE: CPT | Performed by: EMERGENCY MEDICINE

## 2024-12-12 PROCEDURE — 99285 EMERGENCY DEPT VISIT HI MDM: CPT | Mod: 25

## 2024-12-12 PROCEDURE — 84484 ASSAY OF TROPONIN QUANT: CPT | Performed by: EMERGENCY MEDICINE

## 2024-12-12 PROCEDURE — 93005 ELECTROCARDIOGRAM TRACING: CPT

## 2024-12-12 PROCEDURE — 93010 ELECTROCARDIOGRAM REPORT: CPT | Mod: 76,,, | Performed by: INTERNAL MEDICINE

## 2024-12-12 PROCEDURE — 80053 COMPREHEN METABOLIC PANEL: CPT | Performed by: EMERGENCY MEDICINE

## 2024-12-12 PROCEDURE — G0378 HOSPITAL OBSERVATION PER HR: HCPCS

## 2024-12-12 PROCEDURE — 25000003 PHARM REV CODE 250: Performed by: EMERGENCY MEDICINE

## 2024-12-12 PROCEDURE — 83735 ASSAY OF MAGNESIUM: CPT | Performed by: EMERGENCY MEDICINE

## 2024-12-12 PROCEDURE — 85025 COMPLETE CBC W/AUTO DIFF WBC: CPT | Performed by: EMERGENCY MEDICINE

## 2024-12-12 RX ORDER — INSULIN GLARGINE 100 [IU]/ML
INJECTION, SOLUTION SUBCUTANEOUS
COMMUNITY

## 2024-12-12 RX ORDER — ASPIRIN 325 MG
325 TABLET ORAL
Status: COMPLETED | OUTPATIENT
Start: 2024-12-12 | End: 2024-12-12

## 2024-12-12 RX ORDER — ACETAMINOPHEN 325 MG/1
650 TABLET ORAL EVERY 4 HOURS PRN
Status: DISCONTINUED | OUTPATIENT
Start: 2024-12-13 | End: 2024-12-14 | Stop reason: HOSPADM

## 2024-12-12 RX ORDER — ACETYLGLUCOSAMINE 700 MG
CAPSULE ORAL
COMMUNITY

## 2024-12-12 RX ORDER — TALC
6 POWDER (GRAM) TOPICAL NIGHTLY PRN
Status: DISCONTINUED | OUTPATIENT
Start: 2024-12-12 | End: 2024-12-14 | Stop reason: HOSPADM

## 2024-12-12 RX ORDER — ONDANSETRON HYDROCHLORIDE 2 MG/ML
4 INJECTION, SOLUTION INTRAVENOUS EVERY 8 HOURS PRN
Status: DISCONTINUED | OUTPATIENT
Start: 2024-12-12 | End: 2024-12-14 | Stop reason: HOSPADM

## 2024-12-12 RX ORDER — IBUPROFEN 100 MG/5ML
SUSPENSION, ORAL (FINAL DOSE FORM) ORAL
COMMUNITY

## 2024-12-12 RX ORDER — VIT C/E/ZN/COPPR/LUTEIN/ZEAXAN 250MG-90MG
1000 CAPSULE ORAL
COMMUNITY

## 2024-12-12 RX ORDER — EMPAGLIFLOZIN 10 MG/1
TABLET, FILM COATED ORAL
COMMUNITY
Start: 2024-12-10

## 2024-12-12 RX ORDER — ACETAMINOPHEN 325 MG/1
650 TABLET ORAL
Status: COMPLETED | OUTPATIENT
Start: 2024-12-12 | End: 2024-12-12

## 2024-12-12 RX ORDER — SODIUM CHLORIDE 0.9 % (FLUSH) 0.9 %
10 SYRINGE (ML) INJECTION
Status: DISCONTINUED | OUTPATIENT
Start: 2024-12-12 | End: 2024-12-14 | Stop reason: HOSPADM

## 2024-12-12 RX ADMIN — ACETAMINOPHEN 650 MG: 325 TABLET, FILM COATED ORAL at 12:12

## 2024-12-12 RX ADMIN — ASPIRIN 325 MG ORAL TABLET 325 MG: 325 PILL ORAL at 01:12

## 2024-12-12 RX ADMIN — ACETAMINOPHEN 650 MG: 325 TABLET, FILM COATED ORAL at 11:12
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
## 2024-12-12 NOTE — Clinical Note
Subjective   Patient ID: Madhavi Valerio is a 79year old male. Chief Complaint   Patient presents with   â¢ Congestion   â¢ Cough   â¢ Headache     HPI  79year old year-old male who presents with with chief complaints of 1 days nasal congestion, runny nose, chest congestion, dry cough. Past Medical History:   Diagnosis Date   â¢ CAD (coronary artery disease)     s/p PCI RAQUEL to pLAD 7/17, diag and septal jailed-->resulting CCS II angina   â¢ Complete heart block (CMD)    â¢ Diabetes mellitus (CMD)     doesn't check at home   â¢ Hyperlipidemia    â¢ Hypertension    â¢ Ischemic cardiomyopathy     EF 45-50%, NYHA class II symptoms   â¢ Presence of cardiac pacemaker     symptomatic bradycardia dcPM STJ 8/16 (CHB)   â¢ Second degree atrioventricular block, Mobitz (type) I     Mobitz I symptomatic s/p dcPM STJ 8/16 -> now CHB   â¢ SOB (shortness of breath)     <4 METS   â¢ TIA (transient ischemic attack)        MEDICATIONS:  Current Outpatient Medications   Medication Sig   â¢ Ranolazine 1000 MG TABLET SR 12 HR TAKE 1 TABLET TWICE A DAY   â¢ metoPROLOL succinate (TOPROL-XL) 25 MG 24 hr tablet Take 25 mg by mouth daily. â¢ atorvastatin (LIPITOR) 80 MG tablet TAKE 1 TABLET DAILY (Patient taking differently: every morning.)   â¢ benazepril (LOTENSIN) 40 MG tablet TAKE 1 TABLET DAILY (Patient taking differently: every morning.)   â¢ metFORMIN (GLUCOPHAGE) 500 MG tablet Take 500 mg by mouth in the morning and 500 mg in the evening. Take with meals. â¢ Cholecalciferol 125 mcg (5,000 units) capsule Take by mouth daily. â¢ aspirin 81 MG tablet Take 81 mg by mouth every morning. â¢ tadalafil (CIALIS) 20 MG tablet Take 20 mg by mouth as needed. No current facility-administered medications for this visit.        ALLERGIES:  ALLERGIES:  No Known Allergies    PAST SURGICAL HISTORY:  Past Surgical History:   Procedure Laterality Date   â¢ Cardiac surgery Left     pacemaker placement, angiogram Dr. Madiha Rodriguez   â¢ Cyst removal  08/03/2021 The ECG showed sinus rhythm. excised from posterior scalp   â¢ Eye surgery      cataract bilateral   â¢ Hernia repair      x 2        FAMILY HISTORY:  Family History   Problem Relation Age of Onset   â¢ Heart Mother         CABG   â¢ Cancer Father         BONE       SOCIAL HISTORY:  Social History     Tobacco Use   â¢ Smoking status: Never   â¢ Smokeless tobacco: Never   Vaping Use   â¢ Vaping Use: never used   Substance Use Topics   â¢ Alcohol use: Not Currently   â¢ Drug use: No         Patient's medications, allergies, past medical, surgical, and social history including nursing input notes were reviewed and updated as appropriate. Review of Systems   Constitutional: Negative. Negative for chills, fatigue and fever. HENT: Positive for congestion and rhinorrhea. Negative for ear pain, nosebleeds, postnasal drip, sinus pressure, sinus pain, sneezing and sore throat. Eyes: Negative. Respiratory: Positive for cough. Negative for chest tightness, shortness of breath, wheezing and stridor. Cardiovascular: Negative. Negative for chest pain, palpitations and leg swelling. Gastrointestinal: Negative. Endocrine: Negative. Genitourinary: Negative. Musculoskeletal: Negative. Skin: Negative. Neurological: Negative. Hematological: Negative. All other systems reviewed and are negative. Objective   Physical Exam  Vitals and nursing note reviewed. Constitutional:       General: He is not in acute distress. Appearance: Normal appearance. He is not ill-appearing. HENT:      Head: Normocephalic and atraumatic. Right Ear: Tympanic membrane, ear canal and external ear normal.      Left Ear: Tympanic membrane, ear canal and external ear normal.      Nose: Congestion present. No rhinorrhea. Mouth/Throat:      Mouth: Mucous membranes are moist.      Pharynx: Oropharynx is clear. No oropharyngeal exudate or posterior oropharyngeal erythema. Eyes:      Extraocular Movements: Extraocular movements intact. "Conjunctiva/sclera: Conjunctivae normal.      Pupils: Pupils are equal, round, and reactive to light. Cardiovascular:      Rate and Rhythm: Normal rate and regular rhythm. Pulses: Normal pulses. Heart sounds: Normal heart sounds. Pulmonary:      Effort: Pulmonary effort is normal. No respiratory distress. Breath sounds: Normal breath sounds. No stridor. No wheezing, rhonchi or rales. Abdominal:      General: Abdomen is flat. Bowel sounds are normal.      Palpations: Abdomen is soft. Tenderness: There is no abdominal tenderness. Musculoskeletal:         General: Normal range of motion. Lymphadenopathy:      Cervical: No cervical adenopathy. Skin:     General: Skin is warm and dry. Neurological:      General: No focal deficit present. Mental Status: He is alert and oriented to person, place, and time. Mental status is at baseline. Psychiatric:         Mood and Affect: Mood normal.         Behavior: Behavior normal.         Thought Content:  Thought content normal.         Judgment: Judgment normal.         Visit Vitals  BP (!) 148/80   Pulse 78   Temp 98.7 Â°F (37.1 Â°C)   Resp 18   Ht 6' 1"" (1.854 m)   Wt 93 kg (205 lb)   SpO2 97%   BMI 27.05 kg/mÂ²         Assessment   Problem List Items Addressed This Visit    None  Visit Diagnoses     Viral upper respiratory tract infection    -  Primary    Acute cough        Relevant Orders    POCT SARS-COV-2 Antigen/Flu Antigen Panel via [employer] (Completed)    Strep throat exposure        Relevant Orders    POCT Rapid strep A (Completed)          Results for orders placed or performed in visit on 08/11/23   POCT SARS-COV-2 Antigen/Flu Antigen Panel via [employer]   Result Value Ref Range    POCT SARS-COV-2 ANTIGEN Not Detected Not Detected    Rapid Influenza A Ag Negative Negative, Indeterminate    Rapid Influenza B Ag Negative Negative, Indeterminate   POCT Rapid strep A   Result Value Ref Range    GRP A STREP Negative Negative    Internal " Procedural Controls Acceptable Yes Yes        MEDICAL DECISION MAKING:   MDM    This is a 79year old year-old male who presents with with chief complaints of 1 days nasal congestion, runny nose, chest congestion, dry cough. Denies any fever or chills, sore throat, ear pain, sinus pressure, sinus congestion, phlegm, chest pain or shortness of breath. No significant lightheadedness or dizziness. Appetite and activity has been normal so far. Drinking fluids well. Normal voiding. Reports no Covid exposure recently. However was exposed to his grandchild with strep throat. Patient however denies any sore throat patient patient is vaccinated   Vitals are stable. Alert. Nasal mucosa is injected. No significant clear rhinorrhea noted. No maxillary or frontal sinus tenderness noted. Oropharynx is normal.  Normal tympanic membranes bilaterally. Lungs are clear to auscultation. No wheezing rhonchi crackles. No tachycardia noted. No abdominal tenderness. Rapid strep: Negative  COVID-19 antigen: Negative  Flu test: Negative  Symptomatic treatment recommended for now. Steam inhalation. Take Tylenol. Avoid Motrin. Push fluids. Rest.   Return to the immediate care if symptoms are worsening  Proceed to ER if symptoms are worsening.   Instructions provided as documented in the AVS.
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
## 2024-12-13 LAB
OHS QRS DURATION: 156 MS
OHS QRS DURATION: 156 MS
OHS QTC CALCULATION: 482 MS
OHS QTC CALCULATION: 498 MS
POCT GLUCOSE: 136 MG/DL (ref 70–110)
POCT GLUCOSE: 150 MG/DL (ref 70–110)
TROPONIN I SERPL DL<=0.01 NG/ML-MCNC: 0.06 NG/ML (ref 0–0.03)
TROPONIN I SERPL DL<=0.01 NG/ML-MCNC: 0.1 NG/ML (ref 0–0.03)
TROPONIN I SERPL DL<=0.01 NG/ML-MCNC: 0.1 NG/ML (ref 0–0.03)

## 2024-12-13 PROCEDURE — C1769 GUIDE WIRE: HCPCS | Performed by: INTERNAL MEDICINE

## 2024-12-13 PROCEDURE — C1760 CLOSURE DEV, VASC: HCPCS | Performed by: INTERNAL MEDICINE

## 2024-12-13 PROCEDURE — G0378 HOSPITAL OBSERVATION PER HR: HCPCS

## 2024-12-13 PROCEDURE — 63600175 PHARM REV CODE 636 W HCPCS: Performed by: INTERNAL MEDICINE

## 2024-12-13 PROCEDURE — 36415 COLL VENOUS BLD VENIPUNCTURE: CPT | Performed by: EMERGENCY MEDICINE

## 2024-12-13 PROCEDURE — 93454 CORONARY ARTERY ANGIO S&I: CPT | Performed by: INTERNAL MEDICINE

## 2024-12-13 PROCEDURE — 84484 ASSAY OF TROPONIN QUANT: CPT | Mod: 91 | Performed by: EMERGENCY MEDICINE

## 2024-12-13 PROCEDURE — 96372 THER/PROPH/DIAG INJ SC/IM: CPT | Performed by: INTERNAL MEDICINE

## 2024-12-13 PROCEDURE — 25000003 PHARM REV CODE 250: Performed by: INTERNAL MEDICINE

## 2024-12-13 PROCEDURE — C1894 INTRO/SHEATH, NON-LASER: HCPCS | Performed by: INTERNAL MEDICINE

## 2024-12-13 PROCEDURE — 25500020 PHARM REV CODE 255: Performed by: INTERNAL MEDICINE

## 2024-12-13 RX ORDER — CLOPIDOGREL BISULFATE 75 MG/1
75 TABLET ORAL DAILY
Status: DISCONTINUED | OUTPATIENT
Start: 2024-12-13 | End: 2024-12-14 | Stop reason: HOSPADM

## 2024-12-13 RX ORDER — NAPROXEN SODIUM 220 MG/1
81 TABLET, FILM COATED ORAL DAILY
Status: DISCONTINUED | OUTPATIENT
Start: 2024-12-13 | End: 2024-12-14 | Stop reason: HOSPADM

## 2024-12-13 RX ORDER — ACETAMINOPHEN 325 MG/1
650 TABLET ORAL EVERY 4 HOURS PRN
Status: DISCONTINUED | OUTPATIENT
Start: 2024-12-13 | End: 2024-12-14 | Stop reason: HOSPADM

## 2024-12-13 RX ORDER — METOPROLOL SUCCINATE 50 MG/1
50 TABLET, EXTENDED RELEASE ORAL DAILY
Status: DISCONTINUED | OUTPATIENT
Start: 2024-12-13 | End: 2024-12-14 | Stop reason: HOSPADM

## 2024-12-13 RX ORDER — HYDROCODONE BITARTRATE AND ACETAMINOPHEN 10; 325 MG/1; MG/1
1 TABLET ORAL EVERY 4 HOURS PRN
Status: DISCONTINUED | OUTPATIENT
Start: 2024-12-13 | End: 2024-12-14 | Stop reason: HOSPADM

## 2024-12-13 RX ORDER — DIPHENHYDRAMINE HYDROCHLORIDE 50 MG/ML
25 INJECTION INTRAMUSCULAR; INTRAVENOUS EVERY 6 HOURS PRN
Status: DISCONTINUED | OUTPATIENT
Start: 2024-12-13 | End: 2024-12-14 | Stop reason: HOSPADM

## 2024-12-13 RX ORDER — ALUMINUM HYDROXIDE, MAGNESIUM HYDROXIDE, AND SIMETHICONE 1200; 120; 1200 MG/30ML; MG/30ML; MG/30ML
30 SUSPENSION ORAL
Status: DISCONTINUED | OUTPATIENT
Start: 2024-12-13 | End: 2024-12-14 | Stop reason: HOSPADM

## 2024-12-13 RX ORDER — HYDROCODONE BITARTRATE AND ACETAMINOPHEN 5; 325 MG/1; MG/1
1 TABLET ORAL EVERY 4 HOURS PRN
Status: DISCONTINUED | OUTPATIENT
Start: 2024-12-13 | End: 2024-12-14 | Stop reason: HOSPADM

## 2024-12-13 RX ORDER — SODIUM CHLORIDE 9 MG/ML
INJECTION, SOLUTION INTRAVENOUS CONTINUOUS
Status: ACTIVE | OUTPATIENT
Start: 2024-12-13 | End: 2024-12-13

## 2024-12-13 RX ORDER — FENTANYL CITRATE 50 UG/ML
INJECTION, SOLUTION INTRAMUSCULAR; INTRAVENOUS
Status: DISCONTINUED | OUTPATIENT
Start: 2024-12-13 | End: 2024-12-13 | Stop reason: HOSPADM

## 2024-12-13 RX ORDER — ATORVASTATIN CALCIUM 20 MG/1
40 TABLET, FILM COATED ORAL DAILY
Status: DISCONTINUED | OUTPATIENT
Start: 2024-12-13 | End: 2024-12-14 | Stop reason: HOSPADM

## 2024-12-13 RX ORDER — ONDANSETRON 8 MG/1
8 TABLET, ORALLY DISINTEGRATING ORAL EVERY 8 HOURS PRN
Status: DISCONTINUED | OUTPATIENT
Start: 2024-12-13 | End: 2024-12-14 | Stop reason: HOSPADM

## 2024-12-13 RX ORDER — LEVOTHYROXINE SODIUM 88 UG/1
88 TABLET ORAL
Status: DISCONTINUED | OUTPATIENT
Start: 2024-12-14 | End: 2024-12-14 | Stop reason: HOSPADM

## 2024-12-13 RX ORDER — METOPROLOL SUCCINATE 50 MG/1
50 TABLET, EXTENDED RELEASE ORAL DAILY
Status: DISCONTINUED | OUTPATIENT
Start: 2024-12-13 | End: 2024-12-13

## 2024-12-13 RX ORDER — HEPARIN SOD,PORCINE/0.9 % NACL 1000/500ML
INTRAVENOUS SOLUTION INTRAVENOUS
Status: DISCONTINUED | OUTPATIENT
Start: 2024-12-13 | End: 2024-12-13 | Stop reason: HOSPADM

## 2024-12-13 RX ORDER — INSULIN GLARGINE 100 [IU]/ML
12 INJECTION, SOLUTION SUBCUTANEOUS DAILY
Status: CANCELLED | OUTPATIENT
Start: 2024-12-13

## 2024-12-13 RX ORDER — INSULIN GLARGINE 100 [IU]/ML
10 INJECTION, SOLUTION SUBCUTANEOUS DAILY
Status: DISCONTINUED | OUTPATIENT
Start: 2024-12-13 | End: 2024-12-14 | Stop reason: HOSPADM

## 2024-12-13 RX ORDER — LIDOCAINE HYDROCHLORIDE 10 MG/ML
INJECTION, SOLUTION EPIDURAL; INFILTRATION; INTRACAUDAL; PERINEURAL
Status: DISCONTINUED | OUTPATIENT
Start: 2024-12-13 | End: 2024-12-13 | Stop reason: HOSPADM

## 2024-12-13 RX ORDER — NITROGLYCERIN 0.4 MG/1
0.4 TABLET SUBLINGUAL EVERY 5 MIN PRN
Status: DISCONTINUED | OUTPATIENT
Start: 2024-12-13 | End: 2024-12-14 | Stop reason: HOSPADM

## 2024-12-13 RX ADMIN — ACETAMINOPHEN 650 MG: 325 TABLET, FILM COATED ORAL at 08:12

## 2024-12-13 RX ADMIN — ACETAMINOPHEN 650 MG: 325 TABLET, FILM COATED ORAL at 07:12

## 2024-12-13 RX ADMIN — HYDROCODONE BITARTRATE AND ACETAMINOPHEN 1 TABLET: 10; 325 TABLET ORAL at 10:12

## 2024-12-13 RX ADMIN — ASPIRIN 81 MG CHEWABLE TABLET 81 MG: 81 TABLET CHEWABLE at 10:12

## 2024-12-13 RX ADMIN — SODIUM CHLORIDE: 9 INJECTION, SOLUTION INTRAVENOUS at 02:12

## 2024-12-13 RX ADMIN — INSULIN GLARGINE 10 UNITS: 100 INJECTION, SOLUTION SUBCUTANEOUS at 10:12

## 2024-12-13 RX ADMIN — CLOPIDOGREL BISULFATE 75 MG: 75 TABLET ORAL at 10:12

## 2024-12-13 RX ADMIN — ONDANSETRON 8 MG: 8 TABLET, ORALLY DISINTEGRATING ORAL at 08:12

## 2024-12-13 NOTE — H&P
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  She received another shock shortly thereafter, and a soreness in her chest, she lay down and received 3 more shocks.  She had a soreness of her chest wall for a few minutes some shortness of breath before she was brought to the emergency room here.  The breathlessness the chest discomfort settled down upon arrival here, and she is now comfortable and pain-free.  In 2009 she had a heart attack, was taken care of by Dr. Saint Martin, had left ventricular dysfunction, underwent placement of a defibrillator in 2009, had a battery change in 2017.  She has had hypertension and diabetes mellitus.  She is a nonsmoker.    Vital Signs:  Vitals:    12/13/24 0308 12/13/24 0427 12/13/24 0713 12/13/24 0735   BP:  136/67  (!) 115/57   BP Location:  Right arm  Right arm   Patient Position:  Lying  Lying   Pulse: (!) 59 62 60 60   Resp:  20  16   Temp:  97.8 °F (36.6 °C)  97.5 °F (36.4 °C)   TempSrc:  Oral  Oral   SpO2:  99%  95%   Weight:       Height:           Physical Exam:  In no acute distress.  The carotid upstroke is brisk there is no carotid bruit  The chest is clear  The heart size is normal S1 and S2 are normal there is no audible murmur or gallop.  The abdomen is soft and nontender  The extremities are warm  The neurological exam is intact.    Laboratory:  CBC:   Recent Labs   Lab 12/12/24  1011   WBC 5.98   RBC 4.79   HGB 11.1*   HCT 37.6      MCV 79*   MCH 23.2*   MCHC 29.5*     BMP:   Recent Labs   Lab 12/12/24  1011   *      K 4.0      CO2 20*   BUN 17   CREATININE 0.9   CALCIUM 10.2   MG 1.9     CMP:   Recent Labs   Lab 12/12/24  1011   *   CALCIUM 10.2   ALBUMIN 4.2   PROT 7.0      K 4.0   CO2 20*      BUN 17   CREATININE 0.9   ALKPHOS 48   ALT 24   AST 20   BILITOT  "0.6     LFTs:   Recent Labs   Lab 12/12/24  1011   ALT 24   AST 20   ALKPHOS 48   BILITOT 0.6   PROT 7.0   ALBUMIN 4.2     Coagulation: No results for input(s): "PT", "INR", "APTT" in the last 168 hours.  Cardiac markers:   Recent Labs   Lab 12/13/24  0634   TROPONINI 0.061*       Imaging:  X-Ray Chest 1 View   Final Result      As above.         Electronically signed by: Ranulfo Alamo MD   Date:    12/12/2024   Time:    10:05            Problems:   Defibrillator discharge following ventricular tachycardia  Coronary artery disease  Previous myocardial infarction  Hypertension  Diabetes mellitus  Left ventricular dysfunction        Plan of Care: See Orders          Seb Anthony  "

## 2024-12-13 NOTE — PLAN OF CARE
Case Management Assessment     PCP: Sp Connelly III, MD  Pharmacy: [facility]    Patient Arrived From: Home  Existing Help at Home: Spouse     Barriers to Discharge: None    Discharge Plan:    A. Home   B. Home with Family     [facility] ([address])  Initial Discharge Assessment       Primary Care Provider: Sp Connelly III, MD    Admission Diagnosis: Elevated troponin [R79.89]  AICD discharge [Z45.02]    Admission Date: 12/12/2024  Expected Discharge Date:     Transition of Care Barriers: (P) Unable to afford medication    Payor: [employer] / Plan: UMR UNITED SELECT PLUS TIERED / Product Type: Commercial /     Extended Emergency Contact Information  Primary Emergency Contact: Bassem Rhodes  Address: [address] [place] [address] [place]  Mobile Phone: [phone]  Relation: Spouse    Discharge Plan A: (P) Home with family, Home  Discharge Plan B: (P) Home      [id number] [place], [place] - [address] AT [address]  [address]  [place] [address]  Phone: [phone] Fax: [phone]      Initial Assessment (most recent)       Adult Discharge Assessment - 12/13/24 0950          Discharge Assessment    Assessment Type Discharge Planning Assessment (P)      Confirmed/corrected address, phone number and insurance Yes (P)      Confirmed Demographics Correct on Facesheet (P)      Source of Information patient;health record;family (P)      People in Home spouse (P)      Do you expect to return to your current living situation? Yes (P)      Do you have help at home or someone to help you manage your care at home? Yes (P)      Prior to hospitilization cognitive status: Alert/Oriented;No Deficits (P)      Current cognitive status: Alert/Oriented;No Deficits (P)      Equipment Currently Used at Home none (P)      Readmission within 30 days? No (P)      Patient currently being followed by outpatient  case management? No (P)      Do you currently have service(s) that help you manage your care at home? No (P)      Do you take prescription medications? Yes (P)      Do you have prescription coverage? Yes (P)      Do you have any problems affording any of your prescribed medications? Yes (P)      Is the patient taking medications as prescribed? yes (P)      How do you get to doctors appointments? car, drives self;family or friend will provide (P)      Are you on dialysis? No (P)    Patient is diabetic    Do you take coumadin? No (P)      Discharge Plan A Home with family;Home (P)      Discharge Plan B Home (P)      Discharge Plan discussed with: Patient (P)      Transition of Care Barriers Unable to afford medication (P)

## 2024-12-13 NOTE — OR NURSING
VSS on RA. Pt denies pain. Dressing and PIV C/D/I. Neurovascular checks appropriate. Meets PACU discharge criteria. Ready for transfer to , updates given to [facility]. Family called but no answer.

## 2024-12-14 VITALS
TEMPERATURE: 97 F | DIASTOLIC BLOOD PRESSURE: 60 MMHG | SYSTOLIC BLOOD PRESSURE: 118 MMHG | BODY MASS INDEX: 32.49 KG/M2 | WEIGHT: 176.56 LBS | OXYGEN SATURATION: 97 % | HEIGHT: 62 IN | HEART RATE: 60 BPM | RESPIRATION RATE: 18 BRPM

## 2024-12-14 LAB
POCT GLUCOSE: 137 MG/DL (ref 70–110)
POCT GLUCOSE: 149 MG/DL (ref 70–110)
POCT GLUCOSE: 162 MG/DL (ref 70–110)

## 2024-12-14 PROCEDURE — 99900035 HC TECH TIME PER 15 MIN (STAT)

## 2024-12-14 PROCEDURE — 94799 UNLISTED PULMONARY SVC/PX: CPT

## 2024-12-14 PROCEDURE — G0378 HOSPITAL OBSERVATION PER HR: HCPCS

## 2024-12-14 PROCEDURE — 63600175 PHARM REV CODE 636 W HCPCS: Performed by: INTERNAL MEDICINE

## 2024-12-14 PROCEDURE — 96372 THER/PROPH/DIAG INJ SC/IM: CPT | Performed by: INTERNAL MEDICINE

## 2024-12-14 PROCEDURE — 25000003 PHARM REV CODE 250: Performed by: INTERNAL MEDICINE

## 2024-12-14 RX ADMIN — ASPIRIN 81 MG CHEWABLE TABLET 81 MG: 81 TABLET CHEWABLE at 08:12

## 2024-12-14 RX ADMIN — METOPROLOL SUCCINATE 50 MG: 50 TABLET, EXTENDED RELEASE ORAL at 08:12

## 2024-12-14 RX ADMIN — ATORVASTATIN CALCIUM 40 MG: 20 TABLET, FILM COATED ORAL at 09:12

## 2024-12-14 RX ADMIN — CLOPIDOGREL BISULFATE 75 MG: 75 TABLET ORAL at 08:12

## 2024-12-14 RX ADMIN — SITAGLIPTIN 100 MG: 25 TABLET, FILM COATED ORAL at 09:12

## 2024-12-14 RX ADMIN — INSULIN GLARGINE 10 UNITS: 100 INJECTION, SOLUTION SUBCUTANEOUS at 09:12

## 2024-12-14 RX ADMIN — LEVOTHYROXINE SODIUM 88 MCG: 88 TABLET ORAL at 05:12

## 2024-12-14 NOTE — PLAN OF CARE
Problem: Adult Inpatient Plan of Care  Goal: Plan of Care Review  Outcome: Progressing  Goal: Patient-Specific Goal (Individualized)  Outcome: Progressing  Goal: Absence of Hospital-Acquired Illness or Injury  Outcome: Progressing  Goal: Optimal Comfort and Wellbeing  Outcome: Progressing  Goal: Readiness for Transition of Care  Outcome: Progressing     Problem: Wound  Goal: Optimal Coping  Outcome: Progressing  Goal: Absence of Infection Signs and Symptoms  Outcome: Progressing  Goal: Optimal Pain Control and Function  Outcome: Progressing  Goal: Skin Health and Integrity  Outcome: Progressing

## 2024-12-14 NOTE — DISCHARGE SUMMARY
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  She felt a soreness in the chest, lay down and felt the discharge another 3 times.  She was brought to the emergency room here where the interrogation of the device showed runs of ventricular tachycardia.  There was a tiny troponin elevation.  She has a history of diabetes mellitus, had a myocardial infarction in 2009 treated by Dr. Saint Martin.  At angiography at that time she had moderate disease in her left anterior descending coronary artery and was treated with medical treatment.  Since then she has had an ejection fraction between 30 and 35%.    Impression on Admission:  AICD discharge, ventricular tachycardia    Hospital Course:  She has been asymptomatic since admission.  Coronary angiography was performed and this showed mild non flow restrictive disease of the left anterior descending coronary artery and the proximal right coronary artery.  Left circumflex coronary artery was smooth lined and free of flow restrictive disease.  Before starting the procedure transiently her blood pressure was 68/40 .  She was Given IV fluids and this restored the blood pressure to normal.  She has felt well throughout her hospital stay, and tolerated ambulation well.    Disposition:  Discharged to home    Follow up/Patient Instructions:    Medications:     Medication List        CHANGE how you take these medications      metFORMIN 1000 MG tablet  Commonly known as: GLUCOPHAGE  TAKE 1 TABLET BY MOUTH TWICE DAILY  What changed:   how much to take  when to take this            CONTINUE taking these medications      ACCU-CHEK MICHELLE PLUS TEST STRP Strp  Generic drug: blood sugar  diagnostic     ACCU-CHEK MULTICLIX LANCET lancets  Generic drug: lancets     ascorbic acid (vitamin C) 1000 MG tablet  Commonly known as: VITAMIN C     aspirin 81 MG EC tablet  Commonly known as: ECOTRIN     cholecalciferol (vitamin D3) 25 mcg (1,000 unit) capsule  Commonly known as: VITAMIN D3     clopidogreL 75 mg tablet  Commonly known as: PLAVIX  TAKE 1 TABLET BY MOUTH EVERY DAY     JANUVIA 100 mg Tab  Generic drug: SITagliptin phosphate  TAKE 1 TABLET BY MOUTH EVERY DAY     JARDIANCE 10 mg tablet  Generic drug: empagliflozin     LANTUS U-100 INSULIN 100 unit/mL injection  Generic drug: insulin glargine U-100 (Lantus)     levothyroxine 88 MCG tablet  Commonly known as: SYNTHROID  Take 1 tablet (88 mcg total) by mouth before breakfast.     methylsulfonylmethane 1,000 mg Tab     metoprolol succinate 50 MG 24 hr tablet  Commonly known as: TOPROL-XL  TAKE 1 TABLET(50 MG) BY MOUTH EVERY DAY     rosuvastatin 20 MG tablet  Commonly known as: CRESTOR            STOP taking these medications      albuterol 90 mcg/actuation inhaler  Commonly known as: PROVENTIL/VENTOLIN HFA     furosemide 40 MG tablet  Commonly known as: LASIX             No discharge procedures on file.      Condition upon Discharge: Good          Seb Anthony MD

## 2024-12-14 NOTE — PLAN OF CARE
Case Management Final Discharge Note      Discharge Disposition: Home    New DME ordered / company name: None    Relevant SDOH / Transition of Care Barriers:  None    Primary Caretaker and contact information: Self, Independent    Scheduled followup appointment: Advised to schedule seven day hospital follow up appointment    Referrals placed: None    Transportation: Family       12/14/24 1031   Final Note   Assessment Type Final Discharge Note   Anticipated Discharge Disposition Home   Hospital Resources/Appts/Education Provided Provided patient/caregiver with written discharge plan information   Post-Acute Status   Discharge Delays None known at this time     [facility] ([address])  Discharge Final Note    Primary Care Provider: Sp Connelly III, MD    Expected Discharge Date: 12/14/2024    Final Discharge Note (most recent)       Final Note - 12/14/24 1031          Final Note    Assessment Type Final Discharge Note (P)      Anticipated Discharge Disposition Home or Self Care (P)      Hospital Resources/Appts/Education Provided Provided patient/caregiver with written discharge plan information (P)         Post-Acute Status    Discharge Delays None known at this time (P)                      Important Message from Medicare             Contact Info       Sp Connelly III, MD   Specialty: Internal Medicine   Relationship: PCP - General    [facility]  [address] [address]   Phone: [phone]       Next Steps: Schedule an appointment as soon as possible for a visit in 1 week(s)

## 2025-08-01 ENCOUNTER — HOSPITAL ENCOUNTER (INPATIENT)
Facility: OTHER | Age: 65
LOS: 1 days | Discharge: HOME | DRG: 310 | End: 2025-08-02
Attending: EMERGENCY MEDICINE | Admitting: INTERNAL MEDICINE
Payer: COMMERCIAL

## 2025-08-01 DIAGNOSIS — I49.9 DYSRHYTHMIA: ICD-10-CM

## 2025-08-01 DIAGNOSIS — R07.9 CHEST PAIN: ICD-10-CM

## 2025-08-01 DIAGNOSIS — Z95.810 AICD (AUTOMATIC CARDIOVERTER/DEFIBRILLATOR) PRESENT: Primary | ICD-10-CM

## 2025-08-01 LAB
ABSOLUTE EOSINOPHIL (OHS): 0.29 K/UL
ABSOLUTE MONOCYTE (OHS): 0.42 K/UL (ref 0.3–1)
ABSOLUTE NEUTROPHIL COUNT (OHS): 4.02 K/UL (ref 1.8–7.7)
ALBUMIN SERPL BCP-MCNC: 4.1 G/DL (ref 3.5–5.2)
ALP SERPL-CCNC: 55 UNIT/L (ref 40–150)
ALT SERPL W/O P-5'-P-CCNC: 15 UNIT/L (ref 10–44)
ANION GAP (OHS): 11 MMOL/L (ref 8–16)
AST SERPL-CCNC: 22 UNIT/L (ref 11–45)
BASOPHILS # BLD AUTO: 0.02 K/UL
BASOPHILS NFR BLD AUTO: 0.4 %
BILIRUB SERPL-MCNC: 0.4 MG/DL (ref 0.1–1)
BUN SERPL-MCNC: 15 MG/DL (ref 8–23)
CALCIUM SERPL-MCNC: 9.4 MG/DL (ref 8.7–10.5)
CHLORIDE SERPL-SCNC: 108 MMOL/L (ref 95–110)
CO2 SERPL-SCNC: 19 MMOL/L (ref 23–29)
CREAT SERPL-MCNC: 0.7 MG/DL (ref 0.5–1.4)
ERYTHROCYTE [DISTWIDTH] IN BLOOD BY AUTOMATED COUNT: 21.8 % (ref 11.5–14.5)
GFR SERPLBLD CREATININE-BSD FMLA CKD-EPI: >60 ML/MIN/1.73/M2
GLUCOSE SERPL-MCNC: 223 MG/DL (ref 70–110)
HCT VFR BLD AUTO: 37.4 % (ref 37–48.5)
HCT VFR BLD CALC: 36 %PCV (ref 36–54)
HCV AB SERPL QL IA: NORMAL
HGB BLD-MCNC: 11.3 GM/DL (ref 12–16)
HGB BLD-MCNC: 12 G/DL
HIV 1+2 AB+HIV1 P24 AG SERPL QL IA: NORMAL
IMM GRANULOCYTES # BLD AUTO: 0.01 K/UL (ref 0–0.04)
IMM GRANULOCYTES NFR BLD AUTO: 0.2 % (ref 0–0.5)
LYMPHOCYTES # BLD AUTO: 0.64 K/UL (ref 1–4.8)
MCH RBC QN AUTO: 23 PG (ref 27–31)
MCHC RBC AUTO-ENTMCNC: 30.2 G/DL (ref 32–36)
MCV RBC AUTO: 76 FL (ref 82–98)
NT-PROBNP SERPL-MCNC: 148 PG/ML
NUCLEATED RBC (/100WBC) (OHS): 0 /100 WBC
OHS QRS DURATION: 158 MS
OHS QTC CALCULATION: 443 MS
PLATELET # BLD AUTO: 210 K/UL (ref 150–450)
PMV BLD AUTO: 8.8 FL (ref 9.2–12.9)
POC IONIZED CALCIUM: 1.18 MMOL/L (ref 1.06–1.42)
POCT GLUCOSE: 188 MG/DL (ref 70–110)
POCT GLUCOSE: 217 MG/DL (ref 70–110)
POTASSIUM BLD-SCNC: 3.8 MMOL/L (ref 3.5–5.1)
POTASSIUM SERPL-SCNC: 3.9 MMOL/L (ref 3.5–5.1)
PROT SERPL-MCNC: 6.8 GM/DL (ref 6–8.4)
RBC # BLD AUTO: 4.92 M/UL (ref 4–5.4)
RELATIVE EOSINOPHIL (OHS): 5.4 %
RELATIVE LYMPHOCYTE (OHS): 11.9 % (ref 18–48)
RELATIVE MONOCYTE (OHS): 7.8 % (ref 4–15)
RELATIVE NEUTROPHIL (OHS): 74.3 % (ref 38–73)
SAMPLE: NORMAL
SODIUM BLD-SCNC: 140 MMOL/L (ref 136–145)
SODIUM SERPL-SCNC: 138 MMOL/L (ref 136–145)
TROPONIN I SERPL HS-MCNC: 40 NG/L
TROPONIN I SERPL HS-MCNC: 5 NG/L
WBC # BLD AUTO: 5.4 K/UL (ref 3.9–12.7)

## 2025-08-01 PROCEDURE — 94799 UNLISTED PULMONARY SVC/PX: CPT

## 2025-08-01 PROCEDURE — 85025 COMPLETE CBC W/AUTO DIFF WBC: CPT | Performed by: NURSE PRACTITIONER

## 2025-08-01 PROCEDURE — 87389 HIV-1 AG W/HIV-1&-2 AB AG IA: CPT | Performed by: EMERGENCY MEDICINE

## 2025-08-01 PROCEDURE — 11000001 HC ACUTE MED/SURG PRIVATE ROOM

## 2025-08-01 PROCEDURE — 83880 ASSAY OF NATRIURETIC PEPTIDE: CPT | Performed by: NURSE PRACTITIONER

## 2025-08-01 PROCEDURE — 86803 HEPATITIS C AB TEST: CPT | Performed by: EMERGENCY MEDICINE

## 2025-08-01 PROCEDURE — 93010 ELECTROCARDIOGRAM REPORT: CPT | Mod: ,,, | Performed by: INTERNAL MEDICINE

## 2025-08-01 PROCEDURE — 21400001 HC TELEMETRY ROOM

## 2025-08-01 PROCEDURE — 84484 ASSAY OF TROPONIN QUANT: CPT | Performed by: NURSE PRACTITIONER

## 2025-08-01 PROCEDURE — 25000003 PHARM REV CODE 250: Performed by: NURSE PRACTITIONER

## 2025-08-01 PROCEDURE — 36415 COLL VENOUS BLD VENIPUNCTURE: CPT | Performed by: NURSE PRACTITIONER

## 2025-08-01 PROCEDURE — 25000003 PHARM REV CODE 250: Performed by: INTERNAL MEDICINE

## 2025-08-01 PROCEDURE — 63600175 PHARM REV CODE 636 W HCPCS: Performed by: NURSE PRACTITIONER

## 2025-08-01 PROCEDURE — 82962 GLUCOSE BLOOD TEST: CPT

## 2025-08-01 PROCEDURE — 93005 ELECTROCARDIOGRAM TRACING: CPT

## 2025-08-01 PROCEDURE — 82040 ASSAY OF SERUM ALBUMIN: CPT | Performed by: NURSE PRACTITIONER

## 2025-08-01 PROCEDURE — 83036 HEMOGLOBIN GLYCOSYLATED A1C: CPT | Performed by: NURSE PRACTITIONER

## 2025-08-01 PROCEDURE — 99900035 HC TECH TIME PER 15 MIN (STAT)

## 2025-08-01 PROCEDURE — 99285 EMERGENCY DEPT VISIT HI MDM: CPT | Mod: 25

## 2025-08-01 RX ORDER — INSULIN GLARGINE 100 [IU]/ML
10 INJECTION, SOLUTION SUBCUTANEOUS NIGHTLY
Status: DISCONTINUED | OUTPATIENT
Start: 2025-08-01 | End: 2025-08-02 | Stop reason: HOSPADM

## 2025-08-01 RX ORDER — FAMOTIDINE 40 MG/1
40 TABLET, FILM COATED ORAL 2 TIMES DAILY
COMMUNITY

## 2025-08-01 RX ORDER — METOPROLOL SUCCINATE 50 MG/1
50 TABLET, EXTENDED RELEASE ORAL DAILY
Status: DISCONTINUED | OUTPATIENT
Start: 2025-08-02 | End: 2025-08-02 | Stop reason: HOSPADM

## 2025-08-01 RX ORDER — GLUCAGON 1 MG
1 KIT INJECTION
Status: DISCONTINUED | OUTPATIENT
Start: 2025-08-01 | End: 2025-08-02 | Stop reason: HOSPADM

## 2025-08-01 RX ORDER — INSULIN GLARGINE 100 [IU]/ML
10 INJECTION, SOLUTION SUBCUTANEOUS DAILY
Status: DISCONTINUED | OUTPATIENT
Start: 2025-08-02 | End: 2025-08-02 | Stop reason: HOSPADM

## 2025-08-01 RX ORDER — CLOPIDOGREL BISULFATE 75 MG/1
75 TABLET ORAL DAILY
Status: DISCONTINUED | OUTPATIENT
Start: 2025-08-01 | End: 2025-08-02 | Stop reason: HOSPADM

## 2025-08-01 RX ORDER — AMIODARONE HYDROCHLORIDE 200 MG/1
200 TABLET ORAL 3 TIMES DAILY
Status: DISCONTINUED | OUTPATIENT
Start: 2025-08-01 | End: 2025-08-02 | Stop reason: HOSPADM

## 2025-08-01 RX ORDER — ATORVASTATIN CALCIUM 20 MG/1
40 TABLET, FILM COATED ORAL DAILY
Status: DISCONTINUED | OUTPATIENT
Start: 2025-08-01 | End: 2025-08-01 | Stop reason: SDUPTHER

## 2025-08-01 RX ORDER — HYDROCODONE BITARTRATE AND ACETAMINOPHEN 5; 325 MG/1; MG/1
1 TABLET ORAL EVERY 4 HOURS PRN
Status: DISCONTINUED | OUTPATIENT
Start: 2025-08-01 | End: 2025-08-02 | Stop reason: HOSPADM

## 2025-08-01 RX ORDER — ONDANSETRON 8 MG/1
8 TABLET, ORALLY DISINTEGRATING ORAL EVERY 8 HOURS PRN
Status: DISCONTINUED | OUTPATIENT
Start: 2025-08-01 | End: 2025-08-02 | Stop reason: HOSPADM

## 2025-08-01 RX ORDER — FAMOTIDINE 20 MG/1
40 TABLET, FILM COATED ORAL 2 TIMES DAILY
Status: DISCONTINUED | OUTPATIENT
Start: 2025-08-01 | End: 2025-08-02 | Stop reason: HOSPADM

## 2025-08-01 RX ORDER — LEVOTHYROXINE SODIUM 88 UG/1
88 TABLET ORAL
Status: DISCONTINUED | OUTPATIENT
Start: 2025-08-02 | End: 2025-08-02 | Stop reason: HOSPADM

## 2025-08-01 RX ORDER — CLOPIDOGREL BISULFATE 75 MG/1
75 TABLET ORAL DAILY
Status: DISCONTINUED | OUTPATIENT
Start: 2025-08-02 | End: 2025-08-01 | Stop reason: SDUPTHER

## 2025-08-01 RX ORDER — LEVOTHYROXINE SODIUM 88 UG/1
88 TABLET ORAL
Status: DISCONTINUED | OUTPATIENT
Start: 2025-08-02 | End: 2025-08-01 | Stop reason: SDUPTHER

## 2025-08-01 RX ORDER — FAMOTIDINE 20 MG/1
40 TABLET, FILM COATED ORAL 2 TIMES DAILY
Status: DISCONTINUED | OUTPATIENT
Start: 2025-08-01 | End: 2025-08-01 | Stop reason: SDUPTHER

## 2025-08-01 RX ORDER — ACETAMINOPHEN 325 MG/1
650 TABLET ORAL EVERY 8 HOURS PRN
Status: DISCONTINUED | OUTPATIENT
Start: 2025-08-01 | End: 2025-08-02 | Stop reason: HOSPADM

## 2025-08-01 RX ORDER — INSULIN ASPART 100 [IU]/ML
0-5 INJECTION, SOLUTION INTRAVENOUS; SUBCUTANEOUS
Status: DISCONTINUED | OUTPATIENT
Start: 2025-08-01 | End: 2025-08-02 | Stop reason: HOSPADM

## 2025-08-01 RX ORDER — ASPIRIN 81 MG/1
81 TABLET ORAL NIGHTLY
Status: DISCONTINUED | OUTPATIENT
Start: 2025-08-02 | End: 2025-08-01 | Stop reason: SDUPTHER

## 2025-08-01 RX ORDER — IBUPROFEN 200 MG
16 TABLET ORAL
Status: DISCONTINUED | OUTPATIENT
Start: 2025-08-01 | End: 2025-08-02 | Stop reason: HOSPADM

## 2025-08-01 RX ORDER — SODIUM CHLORIDE 0.9 % (FLUSH) 0.9 %
10 SYRINGE (ML) INJECTION
Status: DISCONTINUED | OUTPATIENT
Start: 2025-08-01 | End: 2025-08-02 | Stop reason: HOSPADM

## 2025-08-01 RX ORDER — ASPIRIN 81 MG/1
81 TABLET ORAL DAILY
Status: DISCONTINUED | OUTPATIENT
Start: 2025-08-01 | End: 2025-08-02 | Stop reason: HOSPADM

## 2025-08-01 RX ORDER — TALC
6 POWDER (GRAM) TOPICAL NIGHTLY PRN
Status: DISCONTINUED | OUTPATIENT
Start: 2025-08-01 | End: 2025-08-02 | Stop reason: HOSPADM

## 2025-08-01 RX ORDER — POLYETHYLENE GLYCOL 3350 17 G/17G
17 POWDER, FOR SOLUTION ORAL DAILY
Status: DISCONTINUED | OUTPATIENT
Start: 2025-08-02 | End: 2025-08-02 | Stop reason: HOSPADM

## 2025-08-01 RX ORDER — METOPROLOL SUCCINATE 50 MG/1
50 TABLET, EXTENDED RELEASE ORAL DAILY
Status: DISCONTINUED | OUTPATIENT
Start: 2025-08-01 | End: 2025-08-01 | Stop reason: SDUPTHER

## 2025-08-01 RX ORDER — ATORVASTATIN CALCIUM 20 MG/1
80 TABLET, FILM COATED ORAL NIGHTLY
Status: DISCONTINUED | OUTPATIENT
Start: 2025-08-02 | End: 2025-08-02 | Stop reason: HOSPADM

## 2025-08-01 RX ORDER — IBUPROFEN 200 MG
24 TABLET ORAL
Status: DISCONTINUED | OUTPATIENT
Start: 2025-08-01 | End: 2025-08-02 | Stop reason: HOSPADM

## 2025-08-01 RX ADMIN — AMIODARONE HYDROCHLORIDE 200 MG: 200 TABLET ORAL at 08:08

## 2025-08-01 RX ADMIN — ASPIRIN 81 MG: 81 TABLET, COATED ORAL at 03:08

## 2025-08-01 RX ADMIN — SACUBITRIL AND VALSARTAN 1 TABLET: 24; 26 TABLET, FILM COATED ORAL at 08:08

## 2025-08-01 RX ADMIN — METFORMIN HYDROCHLORIDE 750 MG: 500 TABLET ORAL at 03:08

## 2025-08-01 RX ADMIN — CLOPIDOGREL 75 MG: 75 TABLET ORAL at 03:08

## 2025-08-01 RX ADMIN — INSULIN GLARGINE 10 UNITS: 100 INJECTION, SOLUTION SUBCUTANEOUS at 08:08

## 2025-08-01 RX ADMIN — FAMOTIDINE 40 MG: 20 TABLET, FILM COATED ORAL at 08:08

## 2025-08-01 NOTE — ED NOTES
Preliminary downloaded report from [employer] placed w/ pt label and scanned into chart per Ms. Wolf and report given to Bill ROSADO

## 2025-08-01 NOTE — ED PROVIDER NOTES
Encounter Date: 2025       History     Chief Complaint   Patient presents with    AICD Problem     Pt states her AICD fired 5 times, no LOC, helped self to floor and activated 911     Patient is a 65-year-old female with an AICD who presents to the emergency department after the AICD spontaneously fired today.  She states that it went off multiple times, she believes 4 times total.  She denies chest pain shortness of breath or injuring her head during any of the subsequent firings.  States she has not had any medication changes in his compliant with her current medications.  She has a history of myocardial infarction and diabetes.    The history is provided by the patient. No  was used.     Review of patient's allergies indicates:   Allergen Reactions    Ciprofloxacin Rash     Past Medical History:   Diagnosis Date    Cardiac defibrillator in place 10/06/2009    [employer] model # [id number], serial # [id number]    CHF (congestive heart failure)     Diabetes mellitus     History of myocardial infarction     Hypertension      Past Surgical History:   Procedure Laterality Date    CARDIAC CATHETERIZATION      CARDIAC DEFIBRILLATOR PLACEMENT  10/06/2009     SECTION      X's 3    HERNIA REPAIR      umbilical, X's 2, last done in     LEFT HEART CATHETERIZATION Left 2024    Procedure: Left heart cath;  Surgeon: Seb Anthony MD;  Location: [facility] CATH LAB;  Service: Cardiology;  Laterality: Left;     No family history on file.  Social History[1]  Review of Systems   All other systems reviewed and are negative.      Physical Exam     Initial Vitals [25 0916]   BP Pulse Resp Temp SpO2   (!) 144/82 85 16 98.5 °F (36.9 °C) 98 %      MAP       --         Physical Exam    Nursing note and vitals reviewed.  Constitutional: She appears well-developed and well-nourished.   HENT:   Head: Normocephalic and atraumatic.   Right Ear: External ear normal.   Left Ear: External ear normal.    Nose: Nose normal.   Eyes: Conjunctivae and EOM are normal. Pupils are equal, round, and reactive to light. Right eye exhibits no discharge. Left eye exhibits no discharge.   Neck:   Normal range of motion.  Cardiovascular:  Regular rhythm, S1 normal, S2 normal and normal heart sounds.     Exam reveals no gallop.       No murmur heard.  Pulmonary/Chest: Effort normal and breath sounds normal. No respiratory distress. She has no decreased breath sounds. She has no wheezes. She has no rhonchi. She has no rales.   Abdominal: She exhibits no distension.   Musculoskeletal:         General: Normal range of motion.      Cervical back: Normal range of motion.     Neurological: She is alert and oriented to person, place, and time.   Skin: Skin is dry. Capillary refill takes less than 2 seconds.         ED Course   Procedures  Labs Reviewed   COMPREHENSIVE METABOLIC PANEL - Abnormal       Result Value    Sodium 138      Potassium 3.9      Chloride 108      CO2 19 (*)     Glucose 223 (*)     BUN 15      Creatinine 0.7      Calcium 9.4      Protein Total 6.8      Albumin 4.1      Bilirubin Total 0.4      ALP 55      AST 22      ALT 15      Anion Gap 11      eGFR >60     CBC WITH DIFFERENTIAL - Abnormal    WBC 5.40      RBC 4.92      HGB 11.3 (*)     HCT 37.4      MCV 76 (*)     MCH 23.0 (*)     MCHC 30.2 (*)     RDW 21.8 (*)     Platelet Count 210      MPV 8.8 (*)     Nucleated RBC 0      Neut % 74.3 (*)     Lymph % 11.9 (*)     Mono % 7.8      Eos % 5.4      Basophil % 0.4      Imm Grans % 0.2      Neut # 4.02      Lymph # 0.64 (*)     Mono # 0.42      Eos # 0.29      Baso # 0.02      Imm Grans # 0.01     POCT GLUCOSE - Abnormal    POCT Glucose 217 (*)    TROPONIN I HIGH SENSITIVITY - Normal    Troponin High Sensitive 5     NT-PRO NATRIURETIC PEPTIDE - Normal    NT-proBNP 148      Narrative:     NOTE:  Access complete set of age - and/or gender-specific reference intervals for this test in the [facility]  Manual.   HEPATITIS C ANTIBODY - Normal    Hep C Ab Interp Non-Reactive     HIV 1 / 2 ANTIBODY - Normal    HIV 1/2 Ag/Ab Non-Reactive     CBC W/ AUTO DIFFERENTIAL    Narrative:     The following orders were created for panel order CBC auto differential.  Procedure                               Abnormality         Status                     ---------                               -----------         ------                     CBC with Differential[[id number]]       Abnormal            Final result                 Please view results for these tests on the individual orders.   HEP C VIRUS HOLD SPECIMEN   TROPONIN I HIGH SENSITIVITY   ISTAT PROCEDURE    POC Sodium 140      POC Potassium 3.8      POC Ionized Calcium 1.18      POC Hematocrit 36      POC HEMOGLOBIN 12      Sample VENOUS     POCT GLUCOSE MONITORING CONTINUOUS          Imaging Results              X-Ray Chest AP Portable (Final result)  Result time 08/01/25 09:57:08   Procedure changed from X-Ray Chest PA And Lateral     Final result by Daisy Awad MD (08/01/25 09:57:08)                   Impression:      Stable enlargement of the cardiac silhouette.  Lungs are clear.      Electronically signed by: Daisy Awad  Date:    08/01/2025  Time:    09:57               Narrative:    EXAMINATION:  XR CHEST AP PORTABLE    CLINICAL HISTORY:  aicd firing; Presence of automatic (implantable) cardiac defibrillator    TECHNIQUE:  Single frontal view of the chest was performed.    COMPARISON:  12/12/2024    FINDINGS:  Lungs are well expanded.  No acute consolidation, pleural effusion, or pneumothorax.    Cardiac silhouette is enlarged, stable.  Left chest wall pacing device with leads in stable position.                                       Medications - No data to display  Medical Decision Making  Patient is a 65-year-old female with an AICD who presents to the emergency department after the AICD spontaneously fired today.  She states that it went off multiple  times, she believes 4 times total.  She denies chest pain shortness of breath or injuring her head during any of the subsequent firings.  States she has not had any medication changes in his compliant with her current medications.  She has a history of myocardial infarction and diabetes.    On physical exam the patient is afebrile nontoxic in no apparent distress breath sounds are clear to auscultation heart sounds regular rate and rhythm no murmurs clicks or rubs.      Differential diagnosis includes AICD malfunction, dysrhythmia, acute coronary syndrome.    Problems Addressed:  AICD (automatic cardioverter/defibrillator) present: acute illness or injury     Details: I have discussed the case with  and Dr. Anthony.  Dr. Anthony has accepted the case for inpatient care.  Admit orders written.  Pt on board with this plan.    Amount and/or Complexity of Data Reviewed  Labs: ordered. Decision-making details documented in ED Course.  Radiology: ordered. Decision-making details documented in ED Course.  ECG/medicine tests: ordered. Decision-making details documented in ED Course.    Risk  Decision regarding hospitalization.               ED Course as of 08/01/25 1119   Fri Aug 01, 2025   0925 BP(!): 144/82 [VC]   0925 Temp: 98.5 °F (36.9 °C) [VC]   0925 Temp Source: Oral [VC]   0925 Pulse: 85 [VC]   0925 Resp: 16 [VC]   0925 SpO2: 98 % [VC]   0939 POCT Glucose(!): 217 [VC]   0941 Independent EKG interpretation of the study dated August 1, 2025 at 09:30 normal sinus rhythm no ectopy no ST elevation MI ventricular rate of 82 QTC interval 443 milliseconds.  Right bundle-branch block is present as it was not the previous study of December 12, 2024. [VC]   0941 CBC auto differential(!)  Mild anemia normal white blood cell count normal platelet count. [VC]   0954 --  EKG Interpretation: normal sinus rhythm at 82 beats per minute, no STEMI, no significant acute ST/T abnormalities, normal intervals.  No acute  change compared to prior tracing.  --   [RC]   1009 ISTAT PROCEDURE  Normal istat chemistry. [VC]   1009 BP(!): 126/59 [VC]   1009 Pulse: 77 [VC]   1009 SpO2: 98 % [VC]   1013 X-Ray Chest AP Portable    Stable enlargement of the cardiac silhouette.  Lungs are clear.    [VC]   1020 AICD interpretation reveals that there was a run of V-tach greater than 4 beats and 115 beats per minute during which it fired a total of 5 times.  One fire was successful to reinstate the previous underlying rhythm. [VC]   1045 Troponin I High Sensitivity: 5 [VC]   1045 NT-proBNP: 148 [VC]   1045 Comprehensive metabolic panel(!)  Hyperglycemia present, normal cmp otherwise. [VC]   1046 Resp(!): 21 [VC]   1050 SBAR sent to Dr. Grimaldo. [VC]   1058 HIV 1/2 Ag/Ab: Non-Reactive [VC]   1058 Hepatitis C Ab: Non-Reactive [VC]   1058 Called to notify Dr. Anthony at his phone number, reached [facility].  They state he is here today in cath lab, will attempt to call there. [VC]      ED Course User Index  [RC] Fransico Collins MD  [VC] Bill De Leon DNP                               Clinical Impression:  Final diagnoses:  [R07.9] Chest pain  [Z95.810] AICD (automatic cardioverter/defibrillator) present (Primary)          ED Disposition Condition    Admit              [facility]  Aug 01 2025 11:18 AM [Bill De Leon]  Based on my assessment, this patient had a high probability of imminent or life threatening deterioration due to circulatory failure and cv failure. I personally spent 30 minutes performing critical care services including obtaining history, examination of pt., ordering/reviewing imaging, and ordering/reviewing labs. This excludes time spent performing procedures.         Bill De Leon DNP  25 1119         [1]   Social History  Tobacco Use    Smoking status: Former     Current packs/day: 0.00     Types: Cigarettes     Quit date:      Years since quittin.5   Substance Use  Topics    Alcohol use: No    Drug use: No        Bill De Leon, DNP  08/02/25 1800

## 2025-08-01 NOTE — ED NOTES
Ayo Martell, [employer] rep, to send comprehensive AICD report to ED. Can be reached at [phone].

## 2025-08-01 NOTE — ED NOTES
"Presents to ED post defibrillator "going off for 5 times, 3 times while standing and twice lying on the ground, currently without any c/o chest pain/discomfort/SOB, CM w/ SR int he 80's.  Skin warm and dry, MERA's.  [employer] interrogated   "

## 2025-08-01 NOTE — PHARMACY MED REC
"      Admission Medication History     The home medication history was taken by Marianne Garcia.    You may go to "Admission" then "Reconcile Home Medications" tabs to review and/or act upon these items.     The home medication list has been updated by the Pharmacy department.   Please read ALL comments highlighted in yellow.   Please address this information as you see fit.    Feel free to contact us if you have any questions or require assistance.      The patient has a medication list at bedside.  "

## 2025-08-02 VITALS
OXYGEN SATURATION: 98 % | RESPIRATION RATE: 18 BRPM | DIASTOLIC BLOOD PRESSURE: 66 MMHG | TEMPERATURE: 98 F | SYSTOLIC BLOOD PRESSURE: 138 MMHG | WEIGHT: 178.56 LBS | HEIGHT: 62 IN | HEART RATE: 72 BPM | BODY MASS INDEX: 32.86 KG/M2

## 2025-08-02 PROBLEM — I47.20 VENTRICULAR TACHYCARDIA: Status: ACTIVE | Noted: 2025-08-02

## 2025-08-02 LAB
EAG (OHS): 197 MG/DL (ref 68–131)
HBA1C MFR BLD: 8.5 % (ref 4–5.6)
POCT GLUCOSE: 145 MG/DL (ref 70–110)
POCT GLUCOSE: 166 MG/DL (ref 70–110)

## 2025-08-02 PROCEDURE — 99900035 HC TECH TIME PER 15 MIN (STAT)

## 2025-08-02 PROCEDURE — 63600175 PHARM REV CODE 636 W HCPCS: Performed by: NURSE PRACTITIONER

## 2025-08-02 PROCEDURE — 25000003 PHARM REV CODE 250: Performed by: INTERNAL MEDICINE

## 2025-08-02 PROCEDURE — 94760 N-INVAS EAR/PLS OXIMETRY 1: CPT

## 2025-08-02 PROCEDURE — 25000003 PHARM REV CODE 250: Performed by: NURSE PRACTITIONER

## 2025-08-02 PROCEDURE — 94799 UNLISTED PULMONARY SVC/PX: CPT

## 2025-08-02 RX ORDER — AMIODARONE HYDROCHLORIDE 200 MG/1
200 TABLET ORAL 3 TIMES DAILY
Qty: 90 TABLET | Refills: 11 | Status: SHIPPED | OUTPATIENT
Start: 2025-08-02 | End: 2026-08-02

## 2025-08-02 RX ADMIN — POLYETHYLENE GLYCOL 3350 17 G: 17 POWDER, FOR SOLUTION ORAL at 08:08

## 2025-08-02 RX ADMIN — CLOPIDOGREL 75 MG: 75 TABLET ORAL at 08:08

## 2025-08-02 RX ADMIN — METOPROLOL SUCCINATE 50 MG: 50 TABLET, EXTENDED RELEASE ORAL at 08:08

## 2025-08-02 RX ADMIN — INSULIN GLARGINE 10 UNITS: 100 INJECTION, SOLUTION SUBCUTANEOUS at 08:08

## 2025-08-02 RX ADMIN — FAMOTIDINE 40 MG: 20 TABLET, FILM COATED ORAL at 08:08

## 2025-08-02 RX ADMIN — METFORMIN HYDROCHLORIDE 750 MG: 500 TABLET ORAL at 08:08

## 2025-08-02 RX ADMIN — SACUBITRIL AND VALSARTAN 1 TABLET: 24; 26 TABLET, FILM COATED ORAL at 08:08

## 2025-08-02 RX ADMIN — LEVOTHYROXINE SODIUM 88 MCG: 88 TABLET ORAL at 06:08

## 2025-08-02 RX ADMIN — AMIODARONE HYDROCHLORIDE 200 MG: 200 TABLET ORAL at 08:08

## 2025-08-02 RX ADMIN — EMPAGLIFLOZIN 10 MG: 10 TABLET, FILM COATED ORAL at 08:08

## 2025-08-02 NOTE — PROGRESS NOTES
Discharge orders noted. Additional clinical references attached. Patient's discharge instructions given by bedside RN and reviewed via this VN.  Education provided on new and previous medications, diagnosis, and follow-up appointments.  New prescription for Amiodarone was given to patient. Patient verbalized understanding and teach back method was used. Patient's ride/transportation home at bedside. All questions answered. Transport to [facility] requested. Floor nurse notified.

## 2025-08-02 NOTE — DISCHARGE INSTRUCTIONS
Our goal at [facility] is to always give you outstanding care and exceptional service. You may receive a survey from [employer] by mail, text or e-mail in the next 24-48 hours asking about the care you received with us. The survey should only take 5-10 minutes to complete and is very important to us.     Your feedback provides us with a way to recognize our staff who work tirelessly to provide the best care! Also, your responses help us learn how to improve when your experience was below our aspiration of excellence. We are always looking for ways to improve your stay. We WILL use your feedback to continue making improvements to help us provide the highest quality care. We keep your personal information and feedback confidential. We appreciate your time completing this survey and can't wait to hear from you!!!    We look forward to your continued care with us! Thanks so much for choosing [facility] for your healthcare needs!

## 2025-08-02 NOTE — H&P
[facility] - [facility] ([place])  History & Physical    History of Present Illness: Ms Rhodes was walking to [employer] at 8:45 a.m. on the morning of admission when she felt a shock from her defibrillator.  She sat down, and felt for further shocks.  She was brought to the emergency room here, and had her defibrillator interrogated.  She was admitted to the hospital for further evaluation.  She denies palpitations shortness or breath or chest pains.  In 2009 she apparently had a heart attack, was taken care of by Dr. Saint Martin, was noted to have left ventricular dysfunction, underwent placement of a defibrillator.  Had a battery change in 2017.  In December 2024 she had a shock, and at that time had repeat coronary angiography which showed mild nonproductive disease, and an echocardiogram which showed left ventricular dysfunction with an ejection fraction of 30-35%.    PTA Medications   Medication Sig    ACCU-CHEK MICHELLE PLUS Strp     ascorbic acid, vitamin C, (VITAMIN C) 1000 MG tablet 1 tablet Orally Once a day for 30 day(s)    aspirin (ECOTRIN) 81 MG EC tablet Take by mouth every evening.    cholecalciferol, vitamin D3, (VITAMIN D3) 25 mcg (1,000 unit) capsule Take 1,000 Units by mouth.    clopidogreL (PLAVIX) 75 mg tablet TAKE 1 TABLET BY MOUTH EVERY DAY    famotidine (PEPCID) 40 MG tablet Take 40 mg by mouth 2 (two) times daily.    JARDIANCE 10 mg tablet 1 tablet Orally Once a day for 30 days    LANTUS U-100 INSULIN 100 unit/mL injection 10 units every am   12 units every pm    levothyroxine (SYNTHROID) 88 MCG tablet Take 1 tablet (88 mcg total) by mouth before breakfast.    metFORMIN (GLUCOPHAGE) 1000 MG tablet TAKE 1 TABLET BY MOUTH TWICE DAILY (Patient taking differently: Take 750 mg by mouth every evening.)    methylsulfonylmethane 1,000 mg Tab 1 tablet Orally once daily    metoprolol succinate (TOPROL-XL) 50 MG 24 hr tablet TAKE 1 TABLET(50 MG) BY MOUTH EVERY DAY    rosuvastatin (CRESTOR) 20 MG tablet Take 20 mg  by mouth once daily.    ACCU-CHEK MULTICLIX LANCET lancets     JANUVIA 100 mg Tab TAKE 1 TABLET BY MOUTH EVERY DAY    sacubitriL-valsartan (ENTRESTO) 24-26 mg per tablet Take 1 tablet by mouth 2 (two) times daily.       Review of patient's allergies indicates:   Allergen Reactions    Ciprofloxacin Rash       Past Medical History:   Diagnosis Date    Cardiac defibrillator in place 10/06/2009    Med[employer]onic model # K414LWL, serial # [id number]    CHF (congestive heart failure)     Diabetes mellitus     History of myocardial infarction     Hypertension      Past Surgical History:   Procedure Laterality Date    CARDIAC CATHETERIZATION      CARDIAC DEFIBRILLATOR PLACEMENT  10/06/2009     SECTION      X's 3    HERNIA REPAIR      umbilical, X's 2, last done in     LEFT HEART CATHETERIZATION Left 2024    Procedure: Left heart cath;  Surgeon: Seb Anthony MD;  Location: [facility] CATH LAB;  Service: Cardiology;  Laterality: Left;     No family history on file.  Social History[1]     Physical Exam:  In no acute distress  The carotid upstroke is brisk there is no carotid bruit  Chest clear  Heart regular.  No murmur or gallop.  Abdomen is soft and nontender  Extremities are warm.  There is no pedal edema.  Neurological exam is intact.    Impression on Admission:   Ventricular tachycardia  Status post AICD discharge  Previous myocardial infarction  Left ventricular dysfunction  Diabetes mellitus  Hypertension           [1]   Social History  Tobacco Use    Smoking status: Former     Current packs/day: 0.00     Types: Cigarettes     Quit date:      Years since quittin.5   Substance Use Topics    Alcohol use: No    Drug use: No

## 2025-08-02 NOTE — DISCHARGE SUMMARY
[facility] ([place])  Cardiology  Discharge Summary      Patient Name: Graciela Rhodes    Admission Date: 8/1/2025    Discharge Date and Time:  8 2 2025    Final Diagnoses:  Ventricular tachycardia   Principal Problem: Ventricular tachycardia    HPI: Ms Rhodes felt a shock from her defibrillator while walking to [employer].  Upon sitting down, she had a further 4 shocks, was brought to the hospital for further evaluation.  Interrogation revealed ventricular tachycardia preceding the shock.  She has a history of a heart attack in 2009, has had left ventricular dysfunction, had a defibrillator implanted in 2009, battery change in 2017.  Received a shock in 2024, an echocardiogram which showed an ejection fraction of 30-35%, coronary angiography that showed mild non flow restrictive disease.    Impression on Admission:  Ventricular tachycardia    Hospital Course:  She was admitted to the hospital, started on amiodarone 200 mg 3 times a day.  She was ambulated, and at the time of discharge she continued to remain symptom-free.    Disposition:  Discharged to home    Follow up/Patient Instructions:    Medications:     Medication List        START taking these medications      amiodarone 200 MG Tab  Commonly known as: PACERONE  Take 1 tablet (200 mg total) by mouth 3 (three) times daily.            CHANGE how you take these medications      metFORMIN 1000 MG tablet  Commonly known as: GLUCOPHAGE  TAKE 1 TABLET BY MOUTH TWICE DAILY  What changed:   how much to take  when to take this            CONTINUE taking these medications      ACCU-CHEK MICHELLE PLUS TEST STRP Strp  Generic drug: blood sugar diagnostic     ACCU-CHEK MULTICLIX LANCET lancets  Generic drug: lancets     ascorbic acid (vitamin C) 1000 MG tablet  Commonly known as: VITAMIN C     aspirin 81 MG EC tablet  Commonly known as: ECOTRIN     cholecalciferol (vitamin D3) 25 mcg (1,000 unit) capsule  Commonly known as: VITAMIN D3     clopidogreL 75 mg  tablet  Commonly known as: PLAVIX  TAKE 1 TABLET BY MOUTH EVERY DAY     ENTRESTO 24-26 mg per tablet  Generic drug: sacubitriL-valsartan     famotidine 40 MG tablet  Commonly known as: PEPCID     JARDIANCE 10 mg tablet  Generic drug: empagliflozin     LANTUS U-100 INSULIN 100 unit/mL injection  Generic drug: insulin glargine U-100 (Lantus)     levothyroxine 88 MCG tablet  Commonly known as: SYNTHROID  Take 1 tablet (88 mcg total) by mouth before breakfast.     methylsulfonylmethane 1,000 mg Tab     metoprolol succinate 50 MG 24 hr tablet  Commonly known as: TOPROL-XL  TAKE 1 TABLET(50 MG) BY MOUTH EVERY DAY     rosuvastatin 20 MG tablet  Commonly known as: CRESTOR            STOP taking these medications      JANUVIA 100 mg Tab  Generic drug: SITagliptin phosphate               Where to Get Your Medications        You can get these medications from any pharmacy    Bring a paper prescription for each of these medications  amiodarone 200 MG Tab        No discharge procedures on file.      Condition upon Discharge:  Good          Seb Anthony MD

## 2025-08-02 NOTE — PLAN OF CARE
Problem: Hospitalized Older Adult  Goal: Optimal Cognitive Function  Outcome: Met  Goal: Effective Bowel Elimination  Outcome: Met  Goal: Optimal Coping  Outcome: Met  Goal: Fluid and Electrolyte Balance  Outcome: Met  Goal: Optimal Functional Ability  Outcome: Met  Goal: Improved Oral Intake  Outcome: Met  Goal: Adequate Sleep/Rest  Outcome: Met  Goal: Effective Urinary Elimination  Outcome: Met     Problem: Adult Inpatient Plan of Care  Goal: Plan of Care Review  Outcome: Met  Goal: Patient-Specific Goal (Individualized)  Outcome: Met  Goal: Absence of Hospital-Acquired Illness or Injury  Outcome: Met  Goal: Optimal Comfort and Wellbeing  Outcome: Met  Goal: Readiness for Transition of Care  Outcome: Met     Problem: Diabetes Comorbidity  Goal: Blood Glucose Level Within Targeted Range  Outcome: Met     Problem: Wound  Goal: Optimal Coping  Outcome: Met  Goal: Optimal Functional Ability  Outcome: Met  Goal: Absence of Infection Signs and Symptoms  Outcome: Met  Goal: Improved Oral Intake  Outcome: Met  Goal: Optimal Pain Control and Function  Outcome: Met  Goal: Skin Health and Integrity  Outcome: Met  Goal: Optimal Wound Healing  Outcome: Met     Problem: Cardiac Rhythm Management Device  Goal: Optimal Adjustment to Device  Outcome: Met  Goal: Absence of Bleeding  Outcome: Met  Goal: Effective Device Function  Outcome: Met  Goal: Absence of Infection Signs and Symptoms  Outcome: Met  Goal: Acceptable Pain Level  Outcome: Met  Goal: Effective Oxygenation and Ventilation  Outcome: Met     Problem: Comorbidity Management  Goal: Maintenance of Heart Failure Symptom Control  Outcome: Met  Goal: Blood Pressure in Desired Range  Outcome: Met     
  Problem: Hospitalized Older Adult  Goal: Optimal Cognitive Function  Outcome: Ongoing  Goal: Effective Bowel Elimination  Outcome: Ongoing  Goal: Optimal Coping  Outcome: Ongoing  Goal: Fluid and Electrolyte Balance  Outcome: Ongoing  Goal: Optimal Functional Ability  Outcome: Ongoing  Goal: Improved Oral Intake  Outcome: Ongoing  Goal: Adequate Sleep/Rest  Outcome: Ongoing  Goal: Effective Urinary Elimination  Outcome: Ongoing     Problem: Adult Inpatient Plan of Care  Goal: Plan of Care Review  Outcome: Ongoing  Goal: Patient-Specific Goal (Individualized)  Outcome: Ongoing  Goal: Absence of Hospital-Acquired Illness or Injury  Outcome: Ongoing  Goal: Optimal Comfort and Wellbeing  Outcome: Ongoing  Goal: Readiness for Transition of Care  Outcome: Ongoing     Problem: Diabetes Comorbidity  Goal: Blood Glucose Level Within Targeted Range  Outcome: Ongoing     Problem: Wound  Goal: Optimal Coping  Outcome: Ongoing  Goal: Optimal Functional Ability  Outcome: Ongoing  Goal: Absence of Infection Signs and Symptoms  Outcome: Ongoing  Goal: Improved Oral Intake  Outcome: Ongoing  Goal: Optimal Pain Control and Function  Outcome: Ongoing  Goal: Skin Health and Integrity  Outcome: Ongoing  Goal: Optimal Wound Healing  Outcome: Ongoing     Problem: Cardiac Rhythm Management Device  Goal: Optimal Adjustment to Device  Outcome: Ongoing  Goal: Absence of Bleeding  Outcome: Ongoing  Goal: Effective Device Function  Outcome: Ongoing  Goal: Absence of Infection Signs and Symptoms  Outcome: Ongoing  Goal: Acceptable Pain Level  Outcome: Ongoing  Goal: Effective Oxygenation and Ventilation  Outcome: Ongoing     Problem: Comorbidity Management  Goal: Maintenance of Heart Failure Symptom Control  Outcome: Ongoing  Goal: Blood Pressure in Desired Range  Outcome: Ongoing     
  Problem: Hospitalized Older Adult  Goal: Optimal Cognitive Function  Outcome: Progressing  Goal: Effective Bowel Elimination  Outcome: Progressing  Goal: Optimal Coping  Outcome: Progressing  Goal: Fluid and Electrolyte Balance  Outcome: Progressing  Goal: Optimal Functional Ability  Outcome: Progressing  Goal: Improved Oral Intake  Outcome: Progressing  Goal: Adequate Sleep/Rest  Outcome: Progressing  Goal: Effective Urinary Elimination  Outcome: Progressing     Problem: Adult Inpatient Plan of Care  Goal: Plan of Care Review  Outcome: Progressing  Goal: Patient-Specific Goal (Individualized)  Outcome: Progressing  Goal: Absence of Hospital-Acquired Illness or Injury  Outcome: Progressing  Goal: Optimal Comfort and Wellbeing  Outcome: Progressing  Goal: Readiness for Transition of Care  Outcome: Progressing     Problem: Diabetes Comorbidity  Goal: Blood Glucose Level Within Targeted Range  Outcome: Progressing     Problem: Wound  Goal: Optimal Coping  Outcome: Progressing  Goal: Optimal Functional Ability  Outcome: Progressing  Goal: Absence of Infection Signs and Symptoms  Outcome: Progressing  Goal: Improved Oral Intake  Outcome: Progressing  Goal: Optimal Pain Control and Function  Outcome: Progressing  Goal: Skin Health and Integrity  Outcome: Progressing  Goal: Optimal Wound Healing  Outcome: Progressing     Problem: Cardiac Rhythm Management Device  Goal: Optimal Adjustment to Device  Outcome: Progressing  Goal: Absence of Bleeding  Outcome: Progressing  Goal: Effective Device Function  Outcome: Progressing  Goal: Absence of Infection Signs and Symptoms  Outcome: Progressing  Goal: Acceptable Pain Level  Outcome: Progressing  Goal: Effective Oxygenation and Ventilation  Outcome: Progressing     Problem: Comorbidity Management  Goal: Maintenance of Heart Failure Symptom Control  Outcome: Progressing  Goal: Blood Pressure in Desired Range  Outcome: Progressing     
Case Management Assessment     PCP: Dr. Sp Connelly  Pharmacy: Bedside    Patient Arrived From: Home  Existing Help at Home: Bassem [phone]    Barriers to Discharge: None    Discharge Plan:    A. Home with family   B. Home    Assessment was completed at bedside. Patient denies any dme use at home. Patient is not on coumadin or dialysis. Patient's  will provide transportation home at discharge. Sw will continue to follow.          08/02/25 1314   Discharge Assessment   Assessment Type Discharge Planning Assessment   Confirmed/corrected address, phone number and insurance Yes   Confirmed Demographics Correct on Facesheet   Source of Information patient   People in Home spouse   Name(s) of People in Home Bassem Rhodes-Spouse   Facility Arrived From: Home   Do you expect to return to your current living situation? Yes   Do you have help at home or someone to help you manage your care at home? Yes   Who are your caregiver(s) and their phone number(s)? Bassem Horton-[phone]   Prior to hospitilization cognitive status: Alert/Oriented   Current cognitive status: Alert/Oriented   Walking or Climbing Stairs Difficulty no   Dressing/Bathing Difficulty no   Equipment Currently Used at Home none   Readmission within 30 days? No   Patient currently being followed by outpatient case management? No   Do you currently have service(s) that help you manage your care at home? No   Do you take prescription medications? Yes   Do you have prescription coverage? Yes   Do you have any problems affording any of your prescribed medications? No   Is the patient taking medications as prescribed? yes   Who is going to help you get home at discharge?    How do you get to doctors appointments? family or friend will provide   Are you on dialysis? No   Do you take coumadin? No   Discharge Plan A Home with family   Discharge Plan B Home   DME Needed Upon Discharge  none   Transition of Care Barriers None 
Case Management Final Discharge Note    Discharge Disposition: Home    New DME ordered / company name: None    Relevant SDOH / Transition of Care Barriers:  None     Person available to provide assistance at home when needed and their contact information: Self-care    Scheduled followup appointment: Patient stated that Dr. Anthony told her to come in on Wednesday for follow-up appointment. CM also sent his office a message to contact patient with appointment date and time.     Referrals placed: None    Transportation:  will provide transportation home.     Patient educated on discharge services and updated on DC plan. Bedside RN notified, patient clear to discharge from Case Management Perspective.      [facility] - Med Surg ([place])  Discharge Final Note    Primary Care Provider: Sp Connelly III, MD    Expected Discharge Date: 8/2/2025    Final Discharge Note (most recent)       Final Note - 08/02/25 1314          Final Note    Assessment Type Discharge Planning Assessment                     Important Message from Medicare             Contact Info       Sp Connelly III, MD   Specialty: Internal Medicine   Relationship: PCP - General    [address] [place] [id number]   Phone: [phone]       Next Steps: Schedule an appointment as soon as possible for a visit in 1 week(s)    Instructions: Please make a 1 week hospital follow-up appointment.    Seb Anthony MD   Specialty: Cardiology, Interventional Cardiology, Internal Medicine    [address] [place] [id number]   Phone: [phone]       Next Steps: Follow up    Instructions: Office will call you to schedule follow-up appointment.          
KIKE sent secure message to Dr. Anthony's office requesting they call patient with follow-up appointment.       
VIRTUAL NURSE:  Cued into patient's room.  Permission received per patient to turn camera.  Introduced as VN will be working with floor nurse. Educated on VN's role in patient care.  Plan of care reviewed.  Education per flowsheet.  Informed of fall risk and fall precautions; verbalized understanding.  Call light within reach; bed siderails up x2.  Opportunity given for questions and questions answered.  Admission assessment questions answered.  Denies complaints or any needs at this time.  Instructed to call for assistance.  Will cont to monitor and intervene as needed.    Labs, notes, orders, and careplan initiated.   
VN note: Care plan, orders and labs reviewed.    Problem: Adult Inpatient Plan of Care  Goal: Plan of Care Review  Outcome: Ongoing  Flowsheets (Taken 8/1/2025 6027)  Plan of Care Reviewed With: patient     Problem: Adult Inpatient Plan of Care  Goal: Patient-Specific Goal (Individualized)  Outcome: Ongoing     
Benefits, risks, and possible complications of procedure explained to patient/caregiver who verbalized understanding and gave verbal consent.

## 2025-08-03 LAB
AORTIC SIZE INDEX (SOV): 1.3 CM/M2
AORTIC SIZE INDEX: 1.5 CM/M2
ASCENDING AORTA: 2.8 CM
AV INDEX (PROSTH): 0.45
AV MEAN GRADIENT: 6 MMHG
AV PEAK GRADIENT: 10 MMHG
AV VALVE AREA BY VELOCITY RATIO: 1.1 CM²
AV VALVE AREA: 1.2 CM²
AV VELOCITY RATIO: 0.44
BSA FOR ECHO PROCEDURE: 1.88 M2
CV ECHO LV RWT: 0.23 CM
DOP CALC AO PEAK VEL: 1.6 M/S
DOP CALC AO VTI: 35.8 CM
DOP CALC LVOT AREA: 2.5 CM2
DOP CALC LVOT DIAMETER: 1.8 CM
DOP CALC LVOT PEAK VEL: 0.7 M/S
DOP CALC RVOT PEAK VEL: 0.63 M/S
DOP CALCLVOT PEAK VEL VTI: 16.2 CM
E WAVE DECELERATION TIME: 226 MSEC
E/A RATIO: 0.56
E/E' RATIO: 15 M/S
ECHO LV POSTERIOR WALL: 0.7 CM (ref 0.6–1.1)
FRACTIONAL SHORTENING: 18 % (ref 28–44)
INTERVENTRICULAR SEPTUM: 0.6 CM (ref 0.6–1.1)
IVC DIAMETER: 1.72 CM
LA MAJOR: 6.1 CM
LA MINOR: 6.3 CM
LA WIDTH: 4.5 CM
LEFT ATRIUM AREA SYSTOLIC (APICAL 2 CHAMBER): 24.49 CM2
LEFT ATRIUM AREA SYSTOLIC (APICAL 4 CHAMBER): 21.46 CM2
LEFT ATRIUM SIZE: 4.9 CM
LEFT ATRIUM VOLUME INDEX MOD: 40 ML/M2
LEFT ATRIUM VOLUME INDEX: 64 ML/M2
LEFT ATRIUM VOLUME MOD: 73 ML
LEFT ATRIUM VOLUME: 116 CM3
LEFT INTERNAL DIMENSION IN SYSTOLE: 5 CM (ref 2.1–4)
LEFT VENTRICLE DIASTOLIC VOLUME INDEX: 103.3 ML/M2
LEFT VENTRICLE DIASTOLIC VOLUME: 188 ML
LEFT VENTRICLE END SYSTOLIC VOLUME APICAL 2 CHAMBER: 76.28 ML
LEFT VENTRICLE END SYSTOLIC VOLUME APICAL 4 CHAMBER: 64.28 ML
LEFT VENTRICLE MASS INDEX: 81.8 G/M2
LEFT VENTRICLE SYSTOLIC VOLUME INDEX: 65.4 ML/M2
LEFT VENTRICLE SYSTOLIC VOLUME: 119 ML
LEFT VENTRICULAR INTERNAL DIMENSION IN DIASTOLE: 6.1 CM (ref 3.5–6)
LEFT VENTRICULAR MASS: 148.9 G
LV LATERAL E/E' RATIO: 18 M/S
LV SEPTAL E/E' RATIO: 13.5 M/S
LVED V (TEICH): 187.99 ML
LVES V (TEICH): 118.51 ML
LVOT MG: 1.13 MMHG
LVOT MV: 0.51 CM/S
Lab: 1.5 CM/M
Lab: 1.8 CM/M
MV PEAK A VEL: 0.97 M/S
MV PEAK E VEL: 0.54 M/S
MV STENOSIS PRESSURE HALF TIME: 65.57 MS
MV VALVE AREA P 1/2 METHOD: 3.36 CM2
OHS CV CPX PATIENT HEIGHT IN: 62
OHS CV RV/LV RATIO: 0.64 CM
PISA MRMAX VEL: 4.79 M/S
PISA TR MAX VEL: 2.7 M/S
PV PEAK GRADIENT: 4 MMHG
PV PEAK VELOCITY: 0.97 M/S
RA MAJOR: 4.73 CM
RA WIDTH: 3.5 CM
RIGHT VENTRICLE DIASTOLIC BASEL DIMENSION: 3.9 CM
RV TISSUE DOPPLER FREE WALL SYSTOLIC VELOCITY 1 (APICAL 4 CHAMBER VIEW): 10.57 CM/S
SINUS: 2.4 CM
STJ: 2.2 CM
TDI LATERAL: 0.03 M/S
TDI SEPTAL: 0.04 M/S
TDI: 0.04 M/S
TR MAX PG: 28 MMHG
TRICUSPID ANNULAR PLANE SYSTOLIC EXCURSION: 2 CM
Z-SCORE OF LEFT VENTRICULAR DIMENSION IN END DIASTOLE: 1.91
Z-SCORE OF LEFT VENTRICULAR DIMENSION IN END SYSTOLE: 3.75

## 2025-08-04 LAB — HOLD SPECIMEN: YES

## 2025-08-05 ENCOUNTER — PATIENT OUTREACH (OUTPATIENT)
Dept: ADMINISTRATIVE | Facility: CLINIC | Age: 65
End: 2025-08-05
Payer: COMMERCIAL

## (undated) DEVICE — OMNIPAQUE CONTRAST 350MG/100ML

## (undated) DEVICE — GUIDEWIRE SAFE T .035IN 180CM

## (undated) DEVICE — INTRODUCER CATH 6F 11CM

## (undated) DEVICE — BAG SNAP KOVER BAND 36 X 28 IN

## (undated) DEVICE — CATH DXTERITY JR40 100CM 6FR

## (undated) DEVICE — DEVICE MYNX GRIP 6/7F

## (undated) DEVICE — GUIDEWIRE STD .035X180CM ANG

## (undated) DEVICE — CATH DXTERITY JL35 100CM 6FR

## (undated) DEVICE — TRAY ANGIO BAPTIST

## (undated) DEVICE — CATH DXTERITY JL40 100CM 6FR